# Patient Record
Sex: FEMALE | Race: WHITE | NOT HISPANIC OR LATINO | Employment: FULL TIME | URBAN - NONMETROPOLITAN AREA
[De-identification: names, ages, dates, MRNs, and addresses within clinical notes are randomized per-mention and may not be internally consistent; named-entity substitution may affect disease eponyms.]

---

## 2024-08-14 ENCOUNTER — LAB (OUTPATIENT)
Dept: LAB | Facility: HOSPITAL | Age: 29
End: 2024-08-14
Payer: COMMERCIAL

## 2024-08-14 ENCOUNTER — OFFICE VISIT (OUTPATIENT)
Dept: OBSTETRICS AND GYNECOLOGY | Age: 29
End: 2024-08-14
Payer: COMMERCIAL

## 2024-08-14 ENCOUNTER — LAB REQUISITION (OUTPATIENT)
Dept: LAB | Facility: HOSPITAL | Age: 29
End: 2024-08-14
Payer: COMMERCIAL

## 2024-08-14 VITALS
WEIGHT: 161.6 LBS | DIASTOLIC BLOOD PRESSURE: 76 MMHG | SYSTOLIC BLOOD PRESSURE: 118 MMHG | BODY MASS INDEX: 23.93 KG/M2 | HEIGHT: 69 IN

## 2024-08-14 DIAGNOSIS — R10.9 UNSPECIFIED ABDOMINAL PAIN: ICD-10-CM

## 2024-08-14 DIAGNOSIS — R10.2 PELVIC PAIN: ICD-10-CM

## 2024-08-14 DIAGNOSIS — Z34.90 EARLY STAGE OF PREGNANCY: Primary | ICD-10-CM

## 2024-08-14 DIAGNOSIS — R10.2 PELVIC AND PERINEAL PAIN: ICD-10-CM

## 2024-08-14 DIAGNOSIS — R10.9 LEFT FLANK PAIN: ICD-10-CM

## 2024-08-14 LAB
ABO GROUP BLD: NORMAL
BASOPHILS # BLD AUTO: 0.06 10*3/MM3 (ref 0–0.2)
BASOPHILS NFR BLD AUTO: 0.6 % (ref 0–1.5)
DEPRECATED RDW RBC AUTO: 40.5 FL (ref 37–54)
EOSINOPHIL # BLD AUTO: 0.14 10*3/MM3 (ref 0–0.4)
EOSINOPHIL NFR BLD AUTO: 1.5 % (ref 0.3–6.2)
ERYTHROCYTE [DISTWIDTH] IN BLOOD BY AUTOMATED COUNT: 12.8 % (ref 12.3–15.4)
HCG INTACT+B SERPL-ACNC: 360.5 MIU/ML
HCT VFR BLD AUTO: 38.2 % (ref 34–46.6)
HGB BLD-MCNC: 13 G/DL (ref 12–15.9)
IMM GRANULOCYTES # BLD AUTO: 0.03 10*3/MM3 (ref 0–0.05)
IMM GRANULOCYTES NFR BLD AUTO: 0.3 % (ref 0–0.5)
LYMPHOCYTES # BLD AUTO: 2.23 10*3/MM3 (ref 0.7–3.1)
LYMPHOCYTES NFR BLD AUTO: 23.4 % (ref 19.6–45.3)
MCH RBC QN AUTO: 29.7 PG (ref 26.6–33)
MCHC RBC AUTO-ENTMCNC: 34 G/DL (ref 31.5–35.7)
MCV RBC AUTO: 87.4 FL (ref 79–97)
MONOCYTES # BLD AUTO: 0.7 10*3/MM3 (ref 0.1–0.9)
MONOCYTES NFR BLD AUTO: 7.4 % (ref 5–12)
NEUTROPHILS NFR BLD AUTO: 6.36 10*3/MM3 (ref 1.7–7)
NEUTROPHILS NFR BLD AUTO: 66.8 % (ref 42.7–76)
NRBC BLD AUTO-RTO: 0 /100 WBC (ref 0–0.2)
PLATELET # BLD AUTO: 249 10*3/MM3 (ref 140–450)
PMV BLD AUTO: 10.1 FL (ref 6–12)
RBC # BLD AUTO: 4.37 10*6/MM3 (ref 3.77–5.28)
RH BLD: NEGATIVE
WBC NRBC COR # BLD AUTO: 9.52 10*3/MM3 (ref 3.4–10.8)

## 2024-08-14 PROCEDURE — 36415 COLL VENOUS BLD VENIPUNCTURE: CPT | Performed by: OBSTETRICS & GYNECOLOGY

## 2024-08-14 PROCEDURE — 85025 COMPLETE CBC W/AUTO DIFF WBC: CPT | Performed by: OBSTETRICS & GYNECOLOGY

## 2024-08-14 PROCEDURE — 84702 CHORIONIC GONADOTROPIN TEST: CPT | Performed by: OBSTETRICS & GYNECOLOGY

## 2024-08-14 PROCEDURE — 86900 BLOOD TYPING SEROLOGIC ABO: CPT | Performed by: OBSTETRICS & GYNECOLOGY

## 2024-08-14 PROCEDURE — 86901 BLOOD TYPING SEROLOGIC RH(D): CPT | Performed by: OBSTETRICS & GYNECOLOGY

## 2024-08-14 RX ORDER — PRENATAL VIT NO.126/IRON/FOLIC 28MG-0.8MG
TABLET ORAL DAILY
COMMUNITY

## 2024-08-14 NOTE — PROGRESS NOTES
"    Sanchez San MD  [facility]  [address]  [place] [address]  Office [phone]  Fax [phone]      [facility]  Jesus Chiang  : 1995  MRN: [id number]    Subjective   Subjective     Chief Complaint   Patient presents with    Follow-up     Pt here for pelvic pain during intercourse and positive pregnancy test. Pt states her pain is now persistent pain in back. This morning she took 5 pregnancy tests and all came back positive.       History of Present Illness  Jesus Chiang is a 29 y.o. female , No obstetric history on file., who comes to the office today for early pregnancy and pelvic pain/flank pain. Last week, had dyspareunia. Had a visit with PCP as pain lasted longer and was more painful than usual. Ultrasound at that time noted possible hemorrhagic cyst of left ovary. Received toradol and rx for hydrocodone 7.5 mg. Took it until this past Saturday as pain improved. Was doing well until today. Left flank pain. Ok at the moment but it is uncomfortable. Additionally, cycle is late and pregnancy test is positive.Patient's last menstrual period was 07/15/2024 (exact date). 4w2d by LMP.    Review of Systems   Genitourinary:  Negative for decreased urine volume, difficulty urinating, dyspareunia, dysuria, enuresis, flank pain, frequency, genital sores, hematuria, menstrual problem, pelvic pain, urgency, vaginal bleeding, vaginal discharge and vaginal pain.   Musculoskeletal:  Positive for back pain.   All other systems reviewed and are negative.       The following portions of the patient's history were reviewed and updated as appropriate: allergies, current medications, past family history, past medical history, past social history, past surgical history, and problem list.    Objective    Objective     Vitals:   Visit Vitals  /76   Ht 175.3 cm (69\")   Wt 73.3 kg (161 lb 9.6 oz)   LMP 07/15/2024 (Exact Date)   BMI 23.86 kg/m²        Physical Exam  Vitals reviewed.   Constitutional: "       General: She is not in acute distress.     Appearance: Normal appearance. She is not ill-appearing.   HENT:      Head: Normocephalic and atraumatic.      Nose: No congestion or rhinorrhea.   Eyes:      General: No scleral icterus.        Right eye: No discharge.         Left eye: No discharge.      Extraocular Movements: Extraocular movements intact.      Conjunctiva/sclera: Conjunctivae normal.   Pulmonary:      Effort: Pulmonary effort is normal. No accessory muscle usage or respiratory distress.   Abdominal:      General: Abdomen is flat.      Palpations: Abdomen is soft.      Tenderness: There is no abdominal tenderness. There is left CVA tenderness. There is no right CVA tenderness.   Musculoskeletal:      Right lower leg: No edema.      Left lower leg: No edema.   Skin:     General: Skin is warm and dry.      Coloration: Skin is not ashen, cyanotic or jaundiced.   Neurological:      General: No focal deficit present.      Mental Status: She is alert and oriented to person, place, and time.   Psychiatric:         Mood and Affect: Mood normal.         Behavior: Behavior is cooperative.       Result Review    US Ob < 14 Weeks Single or First Gestation (08/14/2024 13:34)      1.  Uterus: Normal size, with uterine dimensions 8 x 5.6 x 4.8 cm, and Anteverted     2.  Endometrium:  15 mm , no intrauterine pregnancy      3.  Myometrium: Normal homogenous texture      4.  Ovaries  Left:    Normal/unremarkable   Right:   Hemorrhagic vs corpus luteal cyst of the right ovary, measures 2.6 x 1.9 cm        Assessment & Plan   Assessment / Plan     Diagnoses and all orders for this visit:    1. Early stage of pregnancy (Primary)  -     HCG, Quantitative, Pregnancy (Hospital Lab Only)  -     ABO / Rh  -     CBC & Differential    2. Pelvic pain  -     HCG, Quantitative, Pregnancy (Hospital Lab Only)  -     ABO / Rh  -     CBC & Differential    3. Left flank pain        Discussion:   Tylenol encouraged for pain as needed.  Heat therapy to flank only as needed. Avoid toradol. With early pregnancy discussed checking hcg as well as CBC/ABO status. Plan to repeat hcg in 48 hours (Friday, has annual appointment then). She is concerned with ectopic pregnancy. Ultrasound reviewed with patient. Currently hemorrhagic/corpus luteal cyst on the right ovary at this time. Discussed ectopic with patient as well as symptoms typically with ectopic. If worsened pain, she will let us know. Questions answered. She expressed understanding.     Follow-up: Return for Next scheduled follow up.    Sanchez San MD

## 2024-08-15 ENCOUNTER — TELEPHONE (OUTPATIENT)
Dept: OBSTETRICS AND GYNECOLOGY | Age: 29
End: 2024-08-15

## 2024-08-15 DIAGNOSIS — Z34.90 EARLY STAGE OF PREGNANCY: Primary | ICD-10-CM

## 2024-08-15 NOTE — TELEPHONE ENCOUNTER
SHAGGY PARKER    [phone] (PLEASE LVM)    RETURNING A MISSED CALL    GOOD TIME TO CALL IS 12:45 WOULD BE BEST.

## 2024-08-16 ENCOUNTER — OFFICE VISIT (OUTPATIENT)
Dept: OBSTETRICS AND GYNECOLOGY | Age: 29
End: 2024-08-16
Payer: COMMERCIAL

## 2024-08-16 ENCOUNTER — LAB (OUTPATIENT)
Dept: LAB | Facility: HOSPITAL | Age: 29
End: 2024-08-16
Payer: COMMERCIAL

## 2024-08-16 DIAGNOSIS — Z53.21 PROC/TRTMT NOT CRD OUT D/T PT LV BEF SEEN BY HLTH CARE PROV: Primary | ICD-10-CM

## 2024-08-16 DIAGNOSIS — Z34.90 EARLY STAGE OF PREGNANCY: Primary | ICD-10-CM

## 2024-08-16 LAB — HCG INTACT+B SERPL-ACNC: 837.3 MIU/ML

## 2024-08-16 PROCEDURE — 86900 BLOOD TYPING SEROLOGIC ABO: CPT

## 2024-08-16 PROCEDURE — 86850 RBC ANTIBODY SCREEN: CPT

## 2024-08-16 PROCEDURE — 86901 BLOOD TYPING SEROLOGIC RH(D): CPT

## 2024-08-16 PROCEDURE — 36415 COLL VENOUS BLD VENIPUNCTURE: CPT | Performed by: OBSTETRICS & GYNECOLOGY

## 2024-08-16 PROCEDURE — 84702 CHORIONIC GONADOTROPIN TEST: CPT | Performed by: OBSTETRICS & GYNECOLOGY

## 2024-08-19 LAB
ABO GROUP BLD: NORMAL
BLD GP AB SCN SERPL QL: NEGATIVE
RH BLD: NEGATIVE
T&S EXPIRATION DATE: NORMAL

## 2024-08-22 ENCOUNTER — OFFICE VISIT (OUTPATIENT)
Age: 29
End: 2024-08-22
Payer: COMMERCIAL

## 2024-08-22 VITALS
SYSTOLIC BLOOD PRESSURE: 120 MMHG | WEIGHT: 158 LBS | BODY MASS INDEX: 23.4 KG/M2 | DIASTOLIC BLOOD PRESSURE: 70 MMHG | HEIGHT: 69 IN

## 2024-08-22 DIAGNOSIS — O20.0 THREATENED ABORTION: Primary | ICD-10-CM

## 2024-08-22 PROCEDURE — 99213 OFFICE O/P EST LOW 20 MIN: CPT

## 2024-08-22 NOTE — PROGRESS NOTES
"Subjective      Jesus Trevino is a 29 y.o. female who presents for bleeding in early pregnancy. LMP was 2024. Had a positive urine pregnancy test on . This morning she had some cramping and then a gush feeling. When she wiped, there was brown, mucus-like blood present. It continued for a while and then changed to spotting, which continues. She continues to have some cramping.     The following portions of the patient's history were reviewed and updated as appropriate: allergies, current medications, past medical history, past social history, and problem list.    heterosexual    Review of Systems  Review of Systems   Genitourinary:  Positive for amenorrhea, pelvic pain (cramping) and vaginal bleeding (brown, with mucus). Negative for pelvic pressure and vaginal pain.           Objective   US today:  GS: 0.82 cm avg 5w4d  GS w/YS seen today  Cyst Rt Ovary: 1.57 cm  Rt cyst appears hemorrhagic, probably corpus luteum    :      /70 (BP Location: Left arm, Patient Position: Sitting)   Ht 175.3 cm (69\")   Wt 71.7 kg (158 lb)   LMP 07/15/2024 (Exact Date)   BMI 23.33 kg/m²   Physical Exam  Vitals and nursing note reviewed.   Constitutional:       Appearance: Normal appearance. She is normal weight.   Pulmonary:      Effort: Pulmonary effort is normal.   Neurological:      Mental Status: She is alert.   Psychiatric:         Mood and Affect: Mood normal.         Behavior: Behavior normal.         Thought Content: Thought content normal.         Judgment: Judgment normal.             Assessment  & Plan  Diagnoses and all orders for this visit:    1. Threatened  (Primary)  Discussed bleeding in early pregnancy: that it can sometimes resolve spontaneously with no harm to the pregnancy; that it can sometimes be a sign of miscarriage.   Discussed coming back for follow up and ultrasound in 2 weeks. Patient already has scheduled new OB visit with Dr. Ron for 9/10/2024; she asked if she " could simply keep this appointment instead. Agreed that this would be fine. To call/come in with any further concerns prior to that date.  -     HCG, B-subunit, Quantitative ([facility] Only)         This note was electronically signed.    Camille Manzo CNM  8/22/2024  16:04 CDT

## 2024-08-23 LAB — HCG INTACT+B SERPL-ACNC: 6915 MIU/ML

## 2024-09-01 ENCOUNTER — NURSE TRIAGE (OUTPATIENT)
Dept: CALL CENTER | Facility: HOSPITAL | Age: 29
End: 2024-09-01
Payer: COMMERCIAL

## 2024-09-01 NOTE — TELEPHONE ENCOUNTER
"Reason for Disposition  • MILD vaginal bleeding (i.e., less than 1 pad / hour; less than patient's usual menstrual bleeding; not just spotting)    Additional Information  • Negative: Shock suspected (e.g., cold/pale/clammy skin, too weak to stand, low BP, rapid pulse)  • Negative: Difficult to awaken or acting confused (e.g., disoriented, slurred speech)  • Negative: Passed out (i.e., lost consciousness, collapsed and was not responding)  • Negative: Sounds like a life-threatening emergency to the triager  • Negative: [1] Vaginal bleeding AND [2] pregnant 20 or more weeks  • Negative: Not pregnant or pregnancy status unknown  • Negative: SEVERE abdominal pain  • Negative: SEVERE vaginal bleeding (e.g., soaking 2 pads per hour or large blood clots and present 2 or more hours)  • Negative: SEVERE dizziness (e.g., unable to stand, requires support to walk, feels like passing out)  • Negative: [1] MODERATE vaginal bleeding (e.g., soaking 1 pad per hour; clots) AND [2] present > 6 hours  • Negative: [1] MODERATE vaginal bleeding (e.g., soaking 1 pad per hour; clots) AND [2] pregnant > 12 weeks  • Negative: Passed tissue (e.g., gray-white)  • Negative: Shoulder pain  • Negative: Pale skin (pallor) of new-onset or worsening  • Negative: Patient sounds very sick or weak to the triager  • Negative: [1] Constant abdominal pain AND [2] present > 2 hours  • Negative: Fever 100.4 F (38.0 C) or higher  • Negative: [1] Intermittent lower abdominal pain (e.g., cramping) AND [2] present > 24 hours  • Negative: Prior history of \"ectopic pregnancy\" or previous tubal surgery (e.g., tubal ligation)  • Negative: Pain or burning with passing urine (urination)  • Negative: Using heparin (e.g., Lovenox) or other strong blood thinner, or known bleeding disorder (e.g., thrombocytopenia)  • Negative: MODERATE vaginal bleeding (e.g., soaking 1 pad per hour; clots)  • Negative: Has IUD    Answer Assessment - Initial Assessment Questions  1. " "ONSET: \"When did this bleeding start?\"        This am   2. DESCRIPTION: \"Describe the bleeding that you are having.\" \"How much bleeding is there?\"     - SPOTTING: spotting, or pinkish / brownish mucous discharge; does not fill panty liner or pad     - MILD:  less than 1 pad / hour; less than patient's usual menstrual bleeding    - MODERATE: 1-2 pads / hour; 1 menstrual cup every 6 hours; small-medium blood clots (e.g., pea, grape, small coin)    - SEVERE: soaking 2 or more pads/hour for 2 or more hours; 1 menstrual cup every 2 hours; bleeding not contained by pads or continuous red blood from vagina; large blood clots (e.g., golf ball, large coin)       mild  3. ABDOMINAL PAIN SEVERITY: If present, ask: \"How bad is it?\"  (e.g., Scale 1-10; mild, moderate, or severe)    - MILD (1-3): doesn't interfere with normal activities, abdomen soft and not tender to touch     - MODERATE (4-7): interferes with normal activities or awakens from sleep, abdomen tender to touch     - SEVERE (8-10): excruciating pain, doubled over, unable to do any normal activities      Back pain  4. PREGNANCY: \"Do you know how many weeks or months pregnant you are?\" \"When was the first day of your last normal menstrual period?\"      7 weeks  5. HEMODYNAMIC STATUS: \"Are you weak or feeling lightheaded?\" If Yes, ask: \"Can you stand and walk normally?\"       denies  6. OTHER SYMPTOMS: \"What other symptoms are you having with the bleeding?\" (e.g., passed tissue, vaginal discharge, fever, menstrual-type cramps)      Back pain    Protocols used: Pregnancy - Vaginal Bleeding Less Than 20 Weeks [id number]    "

## 2024-09-10 ENCOUNTER — INITIAL PRENATAL (OUTPATIENT)
Age: 29
End: 2024-09-10
Payer: COMMERCIAL

## 2024-09-10 VITALS — DIASTOLIC BLOOD PRESSURE: 82 MMHG | SYSTOLIC BLOOD PRESSURE: 122 MMHG | BODY MASS INDEX: 23.92 KG/M2 | WEIGHT: 162 LBS

## 2024-09-10 DIAGNOSIS — Z34.01 ENCOUNTER FOR SUPERVISION OF NORMAL FIRST PREGNANCY IN FIRST TRIMESTER: Primary | ICD-10-CM

## 2024-09-10 PROCEDURE — 0501F PRENATAL FLOW SHEET: CPT | Performed by: OBSTETRICS & GYNECOLOGY

## 2024-09-10 NOTE — PROGRESS NOTES
New OB, US ordered today, reviewed and shows 8 weeks gestation, EDC 4/20/25  Having nausea, fatigue, but no headaches  Had VB about 10 days ago, none since, DIANA noted on US  New OB labs ordered today  Discussed OTC Unisom and B6  Discussed RSV vaccine, Tdap, and flu vaccine recommendations  Discussed ffDNA screening option  Discussed normal prenatal routines  Questions answered    Diagnoses and all orders for this visit:    1. Encounter for supervision of normal first pregnancy in first trimester (Primary)  -     Chlamydia trachomatis, Neisseria gonorrhoeae, PCR w/ confirmation - Urine, Urine, Clean Catch  -     ABO / Rh  -     Ambulatory Referral to MFM/Perinatology  -     Antibody Screen  -     CBC & Differential  -     Hepatitis B Surface Antigen  -     Urine Culture - Urine, Urine, Clean Catch  -     ToxASSURE Select 13 (MW) - Urine, Clean Catch  -     Rubella Antibody, IgG  -     RPR Qualitative with Reflex to Quant  -     HIV-1 / O / 2 Ag / Antibody  -     Hepatitis C Antibody

## 2024-09-17 LAB
ABO GROUP BLD: NORMAL
BACTERIA UR CULT: NO GROWTH
BACTERIA UR CULT: NORMAL
BASOPHILS # BLD AUTO: 0.1 X10E3/UL (ref 0–0.2)
BASOPHILS NFR BLD AUTO: 1 %
BLD GP AB SCN SERPL QL: NEGATIVE
C TRACH RRNA SPEC QL NAA+PROBE: NEGATIVE
DRUGS UR: NORMAL
EOSINOPHIL # BLD AUTO: 0.1 X10E3/UL (ref 0–0.4)
EOSINOPHIL NFR BLD AUTO: 1 %
ERYTHROCYTE [DISTWIDTH] IN BLOOD BY AUTOMATED COUNT: 13 % (ref 11.7–15.4)
HBV SURFACE AG SERPL QL IA: NEGATIVE
HCT VFR BLD AUTO: 35.8 % (ref 34–46.6)
HCV IGG SERPL QL IA: NON REACTIVE
HGB BLD-MCNC: 11.5 G/DL (ref 11.1–15.9)
HIV 1+2 AB+HIV1 P24 AG SERPL QL IA: NON REACTIVE
IMM GRANULOCYTES # BLD AUTO: 0.1 X10E3/UL (ref 0–0.1)
IMM GRANULOCYTES NFR BLD AUTO: 1 %
LYMPHOCYTES # BLD AUTO: 2 X10E3/UL (ref 0.7–3.1)
LYMPHOCYTES NFR BLD AUTO: 21 %
MCH RBC QN AUTO: 29.3 PG (ref 26.6–33)
MCHC RBC AUTO-ENTMCNC: 32.1 G/DL (ref 31.5–35.7)
MCV RBC AUTO: 91 FL (ref 79–97)
MONOCYTES # BLD AUTO: 0.8 X10E3/UL (ref 0.1–0.9)
MONOCYTES NFR BLD AUTO: 8 %
N GONORRHOEA RRNA SPEC QL NAA+PROBE: NEGATIVE
NEUTROPHILS # BLD AUTO: 6.4 X10E3/UL (ref 1.4–7)
NEUTROPHILS NFR BLD AUTO: 68 %
PLATELET # BLD AUTO: 250 X10E3/UL (ref 150–450)
RBC # BLD AUTO: 3.92 X10E6/UL (ref 3.77–5.28)
RH BLD: NEGATIVE
RPR SER QL: NON REACTIVE
RUBV IGG SERPL IA-ACNC: 1.05 INDEX
WBC # BLD AUTO: 9.5 X10E3/UL (ref 3.4–10.8)

## 2024-10-08 ENCOUNTER — ROUTINE PRENATAL (OUTPATIENT)
Age: 29
End: 2024-10-08
Payer: COMMERCIAL

## 2024-10-08 VITALS — BODY MASS INDEX: 24.22 KG/M2 | SYSTOLIC BLOOD PRESSURE: 104 MMHG | DIASTOLIC BLOOD PRESSURE: 72 MMHG | WEIGHT: 164 LBS

## 2024-10-08 DIAGNOSIS — Z67.91 RH NEGATIVE STATUS DURING PREGNANCY IN SECOND TRIMESTER: Primary | ICD-10-CM

## 2024-10-08 DIAGNOSIS — O26.892 RH NEGATIVE STATUS DURING PREGNANCY IN SECOND TRIMESTER: Primary | ICD-10-CM

## 2024-10-08 RX ORDER — PYRIDOXINE HCL (VITAMIN B6) 100 MG
TABLET ORAL
COMMUNITY

## 2024-10-08 NOTE — PROGRESS NOTES
Feeling well, no nausea  Reviewed dating ultrasound  Reviewed normal prenatal labs  Discussed ffDNA and maternal carrier screening, declines for now  Offered flu vaccine    Diagnoses and all orders for this visit:    1. Rh negative status during pregnancy in second trimester (Primary)

## 2024-11-05 ENCOUNTER — ROUTINE PRENATAL (OUTPATIENT)
Age: 29
End: 2024-11-05
Payer: COMMERCIAL

## 2024-11-05 VITALS — DIASTOLIC BLOOD PRESSURE: 72 MMHG | SYSTOLIC BLOOD PRESSURE: 112 MMHG | WEIGHT: 165 LBS | BODY MASS INDEX: 24.37 KG/M2

## 2024-11-05 DIAGNOSIS — O26.892 RH NEGATIVE STATUS DURING PREGNANCY IN SECOND TRIMESTER: Primary | ICD-10-CM

## 2024-11-05 DIAGNOSIS — Z67.91 RH NEGATIVE STATUS DURING PREGNANCY IN SECOND TRIMESTER: Primary | ICD-10-CM

## 2024-11-05 LAB
BILIRUB BLD-MCNC: NEGATIVE MG/DL
GLUCOSE UR STRIP-MCNC: NEGATIVE MG/DL
KETONES UR QL: NEGATIVE
LEUKOCYTE EST, POC: ABNORMAL
NITRITE UR-MCNC: NEGATIVE MG/ML
PH UR: 7.5 [PH] (ref 5–8)
PROT UR STRIP-MCNC: ABNORMAL MG/DL
RBC # UR STRIP: ABNORMAL /UL
SP GR UR: 1.01 (ref 1–1.03)
UROBILINOGEN UR QL: NORMAL

## 2024-11-05 PROCEDURE — 0502F SUBSEQUENT PRENATAL CARE: CPT | Performed by: OBSTETRICS & GYNECOLOGY

## 2024-11-05 NOTE — PROGRESS NOTES
Feeling well  Gender peek today, GIRL  Schedule anatomy US 12/3    Diagnoses and all orders for this visit:    1. Rh negative status during pregnancy in second trimester (Primary)

## 2024-12-03 ENCOUNTER — ROUTINE PRENATAL (OUTPATIENT)
Age: 29
End: 2024-12-03
Payer: COMMERCIAL

## 2024-12-03 VITALS — WEIGHT: 175 LBS | DIASTOLIC BLOOD PRESSURE: 84 MMHG | BODY MASS INDEX: 25.84 KG/M2 | SYSTOLIC BLOOD PRESSURE: 122 MMHG

## 2024-12-03 DIAGNOSIS — Z67.91 RH NEGATIVE STATUS DURING PREGNANCY IN SECOND TRIMESTER: Primary | ICD-10-CM

## 2024-12-03 DIAGNOSIS — O26.892 RH NEGATIVE STATUS DURING PREGNANCY IN SECOND TRIMESTER: Primary | ICD-10-CM

## 2024-12-03 NOTE — PROGRESS NOTES
Starting to feel fetal movement  Feeling well  Has anatomy US later today  Discussed possible dizzy spells and ensuring adequate hydration    Diagnoses and all orders for this visit:    1. Rh negative status during pregnancy in second trimester (Primary)

## 2025-01-02 ENCOUNTER — ROUTINE PRENATAL (OUTPATIENT)
Age: 30
End: 2025-01-02
Payer: COMMERCIAL

## 2025-01-02 VITALS — BODY MASS INDEX: 26.88 KG/M2 | WEIGHT: 182 LBS | SYSTOLIC BLOOD PRESSURE: 124 MMHG | DIASTOLIC BLOOD PRESSURE: 82 MMHG

## 2025-01-02 DIAGNOSIS — Z67.91 RH NEGATIVE STATUS DURING PREGNANCY IN SECOND TRIMESTER: Primary | ICD-10-CM

## 2025-01-02 DIAGNOSIS — O26.892 RH NEGATIVE STATUS DURING PREGNANCY IN SECOND TRIMESTER: Primary | ICD-10-CM

## 2025-01-02 NOTE — PROGRESS NOTES
Good fetal movement  Reviewed normal anatomy US  Glucola and Hgb ordered for next visit    Diagnoses and all orders for this visit:    1. Rh negative status during pregnancy in second trimester (Primary)

## 2025-01-20 ENCOUNTER — TELEPHONE (OUTPATIENT)
Age: 30
End: 2025-01-20
Payer: COMMERCIAL

## 2025-01-20 ENCOUNTER — HOSPITAL ENCOUNTER (OUTPATIENT)
Facility: HOSPITAL | Age: 30
Discharge: HOME | End: 2025-01-20
Attending: OBSTETRICS & GYNECOLOGY | Admitting: OBSTETRICS & GYNECOLOGY
Payer: COMMERCIAL

## 2025-01-20 VITALS
TEMPERATURE: 98.2 F | WEIGHT: 173 LBS | DIASTOLIC BLOOD PRESSURE: 59 MMHG | BODY MASS INDEX: 25.62 KG/M2 | SYSTOLIC BLOOD PRESSURE: 114 MMHG | HEIGHT: 69 IN | HEART RATE: 96 BPM | RESPIRATION RATE: 16 BRPM

## 2025-01-20 PROBLEM — Z34.90 PREGNANCY: Status: ACTIVE | Noted: 2025-01-20

## 2025-01-20 PROCEDURE — G0463 HOSPITAL OUTPT CLINIC VISIT: HCPCS

## 2025-01-20 PROCEDURE — 59025 FETAL NON-STRESS TEST: CPT

## 2025-01-20 PROCEDURE — G0378 HOSPITAL OBSERVATION PER HR: HCPCS

## 2025-01-20 NOTE — PROGRESS NOTES
Jayy Trevino  : 1995  MRN: [id number]  CSN: [id number]    Labor & Delivery PIO progress note    Subjective   Chief Complaint: She reports decreased fetal movement earlier today, resolved prior to arrival with reassuring fetal surveillance    HPI:     History:    Prenatal Information:  Prenatal Results       Initial Prenatal Labs       Test Value Reference Range Date Time    Hemoglobin  11.5 g/dL 11.1 - 15.9 09/10/24 1331       13.0 g/dL 12.0 - 15.9 24 1431    Hematocrit  35.8 % 34.0 - 46.6 09/10/24 1331       38.2 % 34.0 - 46.6 24 1431    Platelets  250 x10E3/uL 150 - 450 09/10/24 1331       249 10*3/mm3 140 - 450 24 1431    Rubella IgG  1.05 index Immune >0.99 09/10/24 1331    Hepatitis B SAg  Negative  Negative 09/10/24 1331    Hepatitis C Ab  Non Reactive  Non Reactive 09/10/24 1331    RPR  Non Reactive  Non Reactive 09/10/24 1331    T. Pallidum Ab         ABO  A   09/10/24 1331    Rh  Negative   09/10/24 1331    Antibody Screen  Negative  Negative 09/10/24 1331       Negative   24 1239    HIV  Non Reactive  Non Reactive 09/10/24 1331    Urine Culture  Final report   09/10/24 1331    Gonorrhea  Negative  Negative 09/10/24 1331    Chlamydia  Negative  Negative 09/10/24 1331    TSH        HgB A1c         Varicella IgG        Hemoglobinopathy Fractionation        Hemoglobinopathy (genetic testing)        Cystic fibrosis         Spinal muscular atrophy        Fragile X                  Fetal testing        Test Value Reference Range Date Time    NIPT        MSAFP        AFP-4                  2nd and 3rd Trimester       Test Value Reference Range Date Time    Hemoglobin (repeated)        Hematocrit (repeated)        Platelets   250 x10E3/uL 150 - 450 09/10/24 1331       249 10*3/mm3 140 - 450 24 1431    1 hour GTT         Antibody Screen (repeated)        3rd TM syphilis scrn (repeated)  RPR         3rd TM syphilis scrn (repeated) TP-Ab        3rd TM syphilis  screen TB-Ab (FTA)        Syphilis cascade test TP-Ab (EIA)        Syphilis cascade TPPA        GTT Fasting        GTT 1 Hr        GTT 2 Hr        GTT 3 Hr        Group B Strep                  Other testing        Test Value Reference Range Date Time    Parvo IgG         CMV IgG                   Drug Screening       Test Value Reference Range Date Time    Amphetamine Screen        Barbiturate Screen        Benzodiazepine Screen        Methadone Screen        Phencyclidine Screen        Opiates Screen        THC Screen        Cocaine Screen        Propoxyphene Screen        Buprenorphine Screen        Methamphetamine Screen        Oxycodone Screen        Tricyclic Antidepressants Screen                  Legend    ^: Historical                          External Prenatal Results       Pregnancy Outside Results - Transcribed From Office Records - See Scanned Records For Details       Test Value Date Time    ABO  A  09/10/24 1331    Rh  Negative  09/10/24 1331    Antibody Screen  Negative  09/10/24 1331       Negative  08/16/24 1239    Varicella IgG       Rubella  1.05 index 09/10/24 1331    Hgb  11.5 g/dL 09/10/24 1331       13.0 g/dL 08/14/24 1431    Hct  35.8 % 09/10/24 1331       38.2 % 08/14/24 1431    HgB A1c        1h GTT       3h GTT Fasting       3h GTT 1 hour       3h GTT 2 hour       3h GTT 3 hour        Gonorrhea (discrete)  Negative  09/10/24 1331    Chlamydia (discrete)  Negative  09/10/24 1331    RPR  Non Reactive  09/10/24 1331    Syphils cascade: TP-Ab (FTA)       TP-Ab       TP-Ab (EIA)       TPPA       HBsAg  Negative  09/10/24 1331    Herpes Simplex Virus PCR       Herpes Simplex VIrus Culture       HIV  Non Reactive  09/10/24 1331    Hep C RNA Quant PCR       Hep C Antibody  Non Reactive  09/10/24 1331    AFP       NIPT       Cystic Fibrosis (David)       Cystic Fibroisis        Spinal Muscular atrophy       Fragile X       Group B Strep       GBS Susceptibility to Clindamycin       GBS  Susceptibility to Erythromycin       Fetal Fibronectin       Genetic Testing, Maternal Blood                 Drug Screening       Test Value Date Time    Urine Drug Screen       Amphetamine Screen       Barbiturate Screen       Benzodiazepine Screen       Methadone Screen       Phencyclidine Screen       Opiates Screen       THC Screen       Cocaine Screen       Propoxyphene Screen       Buprenorphine Screen       Methamphetamine Screen       Oxycodone Screen       Tricyclic Antidepressants Screen                 Legend    ^: Historical                             Past OB History:     OB History    Para Term  AB Living   1 0 0 0 0 0   SAB IAB Ectopic Molar Multiple Live Births   0 0 0 0 0 0      # Outcome Date GA Lbr Leonel/2nd Weight Sex Type Anes PTL Lv   1 Current                Past Medical History: Past Medical History:   Diagnosis Date    Anxiety and depression     Eating disorder     Anorexia, bulemia, Weight loss pills and laxatives.     GERD (gastroesophageal reflux disease)       Past Surgical History Past Surgical History:   Procedure Laterality Date    ENDOSCOPY      2011    WISDOM TOOTH EXTRACTION        Family History: Family History   Problem Relation Age of Onset    Depression Mother     Depression Father     No Known Problems Sister     Cancer Maternal Grandmother         anal cancer    Cancer Maternal Grandfather         Bone cancer? Unknown primary    Diabetes Maternal Grandfather     Cancer Paternal Grandmother         lung    Cancer Paternal Grandfather         skin cancer    Diabetes Paternal Grandfather       Social History:  reports that she has never smoked. She has never used smokeless tobacco.   reports current alcohol use.   reports no history of drug use.           High Risk Medical Conditions/Complaints this visit: none    Discussion of Management or Test Interpretation with physician/provider/healthcare provider: No    External Records Reviewed: Prenatal history in Epic from  [facility] OB provider - JACI Ron reviewed, pregnancy complicated by: uncomplicated    Review Previous Test Results: Prenatal labs in [facility] reviewed, history of: rh negative    Independent Historian: None    Social Determinants to Health: No drug, transportation or housing or other issues noted      Objective   Medical Decision Making:  Amount/Complexity of Data Reviewed  -Labs ordered - none  -Imaging ordered  -IV fluids given  Risk:  Prescription drug management  Drug therapy requiring intensive monitoring for toxicity  Decision to admit patient - no  Diagnosis/Treatment limited by social determinants    Min/max vitals past 24 hours:  Temp  Min: 98.2 °F (36.8 °C)  Max: 98.2 °F (36.8 °C)   BP  Min: 114/59  Max: 114/59   Pulse  Min: 96  Max: 96   Resp  Min: 16  Max: 16        Independent Interpretation NST/FHT's: reassuring and category 1.  external monitors used   Cervix: was not checked.   Contractions:    Review of current test: results none    N/a       Final Diagnoses: reassuring fetal surveillance       Assessment   IUP at 27w1d  Decreased fetal movement - resolved     Plan   Follow up with primary OB    Maribel Culp MD  1/20/2025  14:17 CST

## 2025-01-20 NOTE — TELEPHONE ENCOUNTER
Pt called with concern of decreased fetal movement.  Advised pt on fetal kick counts.  Educated pt on when to go to LDR.  Pt advised to go to LDR for decreased fetal movement, leaking of fluid, vaginal bleeding or regular/painful contractions.  Pt voiced understanding to all conversation.

## 2025-01-20 NOTE — NON STRESS TEST
Jesus Trevino, a  at 27w1d with an MARY of 2025, by Ultrasound, was seen at [facility] for a nonstress test.    Chief Complaint   Patient presents with    Decreased Fetal Movement     Last felt fetal movement at 1040       Patient Active Problem List   Diagnosis    Need for influenza vaccination    Anxiety and depression    Insomnia    History of eating disorder    Parasomnia    Pregnancy       Start Time: 1440  Stop Time: 1500    Interpretation A  Nonstress Test Interpretation A: Reactive  Comments A: verified by Monique MARIANO rn and Jessica SÁNCHEZ rn

## 2025-01-30 ENCOUNTER — ROUTINE PRENATAL (OUTPATIENT)
Age: 30
End: 2025-01-30
Payer: COMMERCIAL

## 2025-01-30 VITALS — SYSTOLIC BLOOD PRESSURE: 122 MMHG | DIASTOLIC BLOOD PRESSURE: 84 MMHG | BODY MASS INDEX: 27.32 KG/M2 | WEIGHT: 185 LBS

## 2025-01-30 DIAGNOSIS — O26.892 RH NEGATIVE STATUS DURING PREGNANCY IN SECOND TRIMESTER: Primary | ICD-10-CM

## 2025-01-30 DIAGNOSIS — Z3A.28 28 WEEKS GESTATION OF PREGNANCY: ICD-10-CM

## 2025-01-30 DIAGNOSIS — Z67.91 RH NEGATIVE STATUS DURING PREGNANCY IN SECOND TRIMESTER: Primary | ICD-10-CM

## 2025-01-30 NOTE — PROGRESS NOTES
Good fetal movement  Reviewed normal anatomy ultrasound  Glucola and Hgb today, Rh positive  Discuss Tdap next visit  Discussed [place] contractions    Diagnoses and all orders for this visit:    1. Rh negative status during pregnancy in second trimester (Primary)  -     Antibody Screen  -     Rhogam Immune Globulin Immunization    2. 28 weeks gestation of pregnancy  -     Gestational Screen 1 Hr ([facility])  -     Hemoglobin  -     RPR Qualitative with Reflex to Quant

## 2025-01-31 LAB
BLD GP AB SCN SERPL QL: NEGATIVE
GLUCOSE 1H P 50 G GLC PO SERPL-MCNC: 108 MG/DL (ref 70–139)
HGB BLD-MCNC: 11.1 G/DL (ref 11.1–15.9)
RPR SER QL: REACTIVE
RPR SER-TITR: ABNORMAL TITER

## 2025-02-05 DIAGNOSIS — A53.0 POSITIVE RPR TEST: Primary | ICD-10-CM

## 2025-02-06 LAB — TREPONEMA PALLIDUM IGG+IGM AB [PRESENCE] IN SERUM OR PLASMA BY IMMUNOASSAY: NON REACTIVE

## 2025-02-13 ENCOUNTER — ROUTINE PRENATAL (OUTPATIENT)
Age: 30
End: 2025-02-13
Payer: COMMERCIAL

## 2025-02-13 VITALS — WEIGHT: 187 LBS | BODY MASS INDEX: 27.62 KG/M2 | SYSTOLIC BLOOD PRESSURE: 132 MMHG | DIASTOLIC BLOOD PRESSURE: 82 MMHG

## 2025-02-13 DIAGNOSIS — O26.892 RH NEGATIVE STATUS DURING PREGNANCY IN SECOND TRIMESTER: Primary | ICD-10-CM

## 2025-02-13 DIAGNOSIS — Z67.91 RH NEGATIVE STATUS DURING PREGNANCY IN SECOND TRIMESTER: Primary | ICD-10-CM

## 2025-02-13 DIAGNOSIS — Z3A.30 30 WEEKS GESTATION OF PREGNANCY: ICD-10-CM

## 2025-02-13 NOTE — PROGRESS NOTES
Good fetal movement  No contractions, reflux controlled by Prilosec but having some leg cramps  Recommend magnesium supplement  Reviewed normal Glucola and Hgb  Tdap today   labor precautions    Diagnoses and all orders for this visit:    1. Rh negative status during pregnancy in second trimester (Primary)    2. 30 weeks gestation of pregnancy  -     Tdap Vaccine Greater Than or Equal To 6yo IM

## 2025-02-27 ENCOUNTER — ROUTINE PRENATAL (OUTPATIENT)
Age: 30
End: 2025-02-27
Payer: COMMERCIAL

## 2025-02-27 VITALS — DIASTOLIC BLOOD PRESSURE: 82 MMHG | WEIGHT: 190 LBS | SYSTOLIC BLOOD PRESSURE: 132 MMHG | BODY MASS INDEX: 28.06 KG/M2

## 2025-02-27 DIAGNOSIS — O26.892 RH NEGATIVE STATUS DURING PREGNANCY IN SECOND TRIMESTER: Primary | ICD-10-CM

## 2025-02-27 DIAGNOSIS — Z67.91 RH NEGATIVE STATUS DURING PREGNANCY IN SECOND TRIMESTER: Primary | ICD-10-CM

## 2025-02-27 DIAGNOSIS — Z3A.32 32 WEEKS GESTATION OF PREGNANCY: ICD-10-CM

## 2025-02-27 NOTE — PROGRESS NOTES
Good fetal movement  No contractions, still having leg cramps, will increase magnesium supplement  Reviewed normal Glucola and Hgb   labor precautions    Diagnoses and all orders for this visit:    1. Rh negative status during pregnancy in second trimester (Primary)    2. 32 weeks gestation of pregnancy

## 2025-03-13 ENCOUNTER — ROUTINE PRENATAL (OUTPATIENT)
Age: 30
End: 2025-03-13
Payer: COMMERCIAL

## 2025-03-13 VITALS — SYSTOLIC BLOOD PRESSURE: 134 MMHG | DIASTOLIC BLOOD PRESSURE: 80 MMHG | BODY MASS INDEX: 28.5 KG/M2 | WEIGHT: 193 LBS

## 2025-03-13 DIAGNOSIS — O26.892 RH NEGATIVE STATUS DURING PREGNANCY IN SECOND TRIMESTER: Primary | ICD-10-CM

## 2025-03-13 DIAGNOSIS — Z67.91 RH NEGATIVE STATUS DURING PREGNANCY IN SECOND TRIMESTER: Primary | ICD-10-CM

## 2025-03-13 DIAGNOSIS — Z3A.34 34 WEEKS GESTATION OF PREGNANCY: ICD-10-CM

## 2025-03-13 NOTE — PROGRESS NOTES
Good fetal movement  Labor precautions  Reviewed normal 1 hour glucose screening  GBS and cx's next visit    Diagnoses and all orders for this visit:    1. Rh negative status during pregnancy in second trimester (Primary)    2. 34 weeks gestation of pregnancy

## 2025-03-27 ENCOUNTER — ROUTINE PRENATAL (OUTPATIENT)
Age: 30
End: 2025-03-27
Payer: COMMERCIAL

## 2025-03-27 VITALS — DIASTOLIC BLOOD PRESSURE: 82 MMHG | WEIGHT: 199 LBS | BODY MASS INDEX: 29.39 KG/M2 | SYSTOLIC BLOOD PRESSURE: 152 MMHG

## 2025-03-27 DIAGNOSIS — Z67.91 RH NEGATIVE STATUS DURING PREGNANCY IN SECOND TRIMESTER: Primary | ICD-10-CM

## 2025-03-27 DIAGNOSIS — Z3A.36 36 WEEKS GESTATION OF PREGNANCY: ICD-10-CM

## 2025-03-27 DIAGNOSIS — O26.892 RH NEGATIVE STATUS DURING PREGNANCY IN SECOND TRIMESTER: Primary | ICD-10-CM

## 2025-03-27 PROCEDURE — 0502F SUBSEQUENT PRENATAL CARE: CPT | Performed by: OBSTETRICS & GYNECOLOGY

## 2025-03-27 NOTE — PROGRESS NOTES
Good fetal movement  Reviewed normal 1 hour glucose  Cervix closed, 50%, soft, posterior  GBS and GC/Chl ordered and done  Labor instructions    Diagnoses and all orders for this visit:    1. Rh negative status during pregnancy in second trimester (Primary)    2. 36 weeks gestation of pregnancy  -     Chlamydia trachomatis, Neisseria gonorrhoeae, PCR w/ confirmation - Swab, Cervix  -     Strep B Screen - Swab, Vaginal/Rectum

## 2025-03-28 ENCOUNTER — HOSPITAL ENCOUNTER (INPATIENT)
Facility: HOSPITAL | Age: 30
LOS: 3 days | Discharge: HOME | End: 2025-03-31
Attending: OBSTETRICS & GYNECOLOGY | Admitting: OBSTETRICS & GYNECOLOGY
Payer: COMMERCIAL

## 2025-03-28 ENCOUNTER — ANESTHESIA EVENT (OUTPATIENT)
Dept: LABOR AND DELIVERY | Facility: HOSPITAL | Age: 30
End: 2025-03-28
Payer: COMMERCIAL

## 2025-03-28 ENCOUNTER — ANESTHESIA (OUTPATIENT)
Dept: LABOR AND DELIVERY | Facility: HOSPITAL | Age: 30
End: 2025-03-28
Payer: COMMERCIAL

## 2025-03-28 PROBLEM — Z34.90 PREGNANCY: Status: RESOLVED | Noted: 2025-01-20 | Resolved: 2025-03-28

## 2025-03-28 LAB
ABO GROUP BLD: NORMAL
BASOPHILS # BLD AUTO: 0.05 10*3/MM3 (ref 0–0.2)
BASOPHILS NFR BLD AUTO: 0.4 % (ref 0–1.5)
BLD GP AB SCN SERPL QL: POSITIVE
DEPRECATED RDW RBC AUTO: 44.9 FL (ref 37–54)
EOSINOPHIL # BLD AUTO: 0.11 10*3/MM3 (ref 0–0.4)
EOSINOPHIL NFR BLD AUTO: 0.9 % (ref 0.3–6.2)
ERYTHROCYTE [DISTWIDTH] IN BLOOD BY AUTOMATED COUNT: 13.9 % (ref 12.3–15.4)
HCT VFR BLD AUTO: 34.4 % (ref 34–46.6)
HGB BLD-MCNC: 11.6 G/DL (ref 12–15.9)
IMM GRANULOCYTES # BLD AUTO: 0.1 10*3/MM3 (ref 0–0.05)
IMM GRANULOCYTES NFR BLD AUTO: 0.8 % (ref 0–0.5)
LYMPHOCYTES # BLD AUTO: 2.27 10*3/MM3 (ref 0.7–3.1)
LYMPHOCYTES NFR BLD AUTO: 18.3 % (ref 19.6–45.3)
MCH RBC QN AUTO: 30 PG (ref 26.6–33)
MCHC RBC AUTO-ENTMCNC: 33.7 G/DL (ref 31.5–35.7)
MCV RBC AUTO: 88.9 FL (ref 79–97)
MONOCYTES # BLD AUTO: 1.03 10*3/MM3 (ref 0.1–0.9)
MONOCYTES NFR BLD AUTO: 8.3 % (ref 5–12)
NEUTROPHILS NFR BLD AUTO: 71.3 % (ref 42.7–76)
NEUTROPHILS NFR BLD AUTO: 8.87 10*3/MM3 (ref 1.7–7)
NRBC BLD AUTO-RTO: 0 /100 WBC (ref 0–0.2)
PLATELET # BLD AUTO: 208 10*3/MM3 (ref 140–450)
PMV BLD AUTO: 10.6 FL (ref 6–12)
RBC # BLD AUTO: 3.87 10*6/MM3 (ref 3.77–5.28)
RESIDUAL RHIG DETECTED: NORMAL
RH BLD: NEGATIVE
T&S EXPIRATION DATE: NORMAL
TREPONEMA PALLIDUM IGG+IGM AB [PRESENCE] IN SERUM OR PLASMA BY IMMUNOASSAY: NORMAL
WBC NRBC COR # BLD AUTO: 12.43 10*3/MM3 (ref 3.4–10.8)

## 2025-03-28 PROCEDURE — 25010000002 LIDOCAINE PF 2% 2 % SOLUTION: Performed by: NURSE ANESTHETIST, CERTIFIED REGISTERED

## 2025-03-28 PROCEDURE — 25810000003 LACTATED RINGERS PER 1000 ML: Performed by: OBSTETRICS & GYNECOLOGY

## 2025-03-28 PROCEDURE — 51702 INSERT TEMP BLADDER CATH: CPT

## 2025-03-28 PROCEDURE — 25010000002 LIDOCAINE PF 1% 1 % SOLUTION: Performed by: NURSE ANESTHETIST, CERTIFIED REGISTERED

## 2025-03-28 PROCEDURE — 86901 BLOOD TYPING SEROLOGIC RH(D): CPT | Performed by: OBSTETRICS & GYNECOLOGY

## 2025-03-28 PROCEDURE — 86870 RBC ANTIBODY IDENTIFICATION: CPT | Performed by: OBSTETRICS & GYNECOLOGY

## 2025-03-28 PROCEDURE — 86850 RBC ANTIBODY SCREEN: CPT | Performed by: OBSTETRICS & GYNECOLOGY

## 2025-03-28 PROCEDURE — 0HQ9XZZ REPAIR PERINEUM SKIN, EXTERNAL APPROACH: ICD-10-PCS | Performed by: OBSTETRICS & GYNECOLOGY

## 2025-03-28 PROCEDURE — 85025 COMPLETE CBC W/AUTO DIFF WBC: CPT | Performed by: OBSTETRICS & GYNECOLOGY

## 2025-03-28 PROCEDURE — 25010000002 ROPIVACAINE PER 1 MG: Performed by: NURSE ANESTHETIST, CERTIFIED REGISTERED

## 2025-03-28 PROCEDURE — C1755 CATHETER, INTRASPINAL: HCPCS

## 2025-03-28 PROCEDURE — 86780 TREPONEMA PALLIDUM: CPT | Performed by: OBSTETRICS & GYNECOLOGY

## 2025-03-28 PROCEDURE — 59400 OBSTETRICAL CARE: CPT | Performed by: OBSTETRICS & GYNECOLOGY

## 2025-03-28 PROCEDURE — 86900 BLOOD TYPING SEROLOGIC ABO: CPT | Performed by: OBSTETRICS & GYNECOLOGY

## 2025-03-28 PROCEDURE — 25010000002 BUTORPHANOL PER 1 MG: Performed by: OBSTETRICS & GYNECOLOGY

## 2025-03-28 PROCEDURE — 25010000002 PENICILLIN G POTASSIUM PER 600000 UNITS: Performed by: OBSTETRICS & GYNECOLOGY

## 2025-03-28 PROCEDURE — 86850 RBC ANTIBODY SCREEN: CPT

## 2025-03-28 PROCEDURE — 99202 OFFICE O/P NEW SF 15 MIN: CPT | Performed by: OBSTETRICS & GYNECOLOGY

## 2025-03-28 RX ORDER — SODIUM CHLORIDE, SODIUM LACTATE, POTASSIUM CHLORIDE, CALCIUM CHLORIDE 600; 310; 30; 20 MG/100ML; MG/100ML; MG/100ML; MG/100ML
125 INJECTION, SOLUTION INTRAVENOUS CONTINUOUS
Status: DISCONTINUED | OUTPATIENT
Start: 2025-03-28 | End: 2025-03-29

## 2025-03-28 RX ORDER — OXYTOCIN/0.9 % SODIUM CHLORIDE 30/500 ML
250 PLASTIC BAG, INJECTION (ML) INTRAVENOUS CONTINUOUS
Status: ACTIVE | OUTPATIENT
Start: 2025-03-29 | End: 2025-03-29

## 2025-03-28 RX ORDER — CITRIC ACID/SODIUM CITRATE 334-500MG
30 SOLUTION, ORAL ORAL ONCE
Status: DISCONTINUED | OUTPATIENT
Start: 2025-03-28 | End: 2025-03-29 | Stop reason: HOSPADM

## 2025-03-28 RX ORDER — ROPIVACAINE HYDROCHLORIDE 2 MG/ML
INJECTION, SOLUTION EPIDURAL; INFILTRATION; PERINEURAL CONTINUOUS PRN
Status: DISCONTINUED | OUTPATIENT
Start: 2025-03-28 | End: 2025-03-28

## 2025-03-28 RX ORDER — ONDANSETRON 4 MG/1
4 TABLET, ORALLY DISINTEGRATING ORAL EVERY 6 HOURS PRN
Status: DISCONTINUED | OUTPATIENT
Start: 2025-03-28 | End: 2025-03-29 | Stop reason: HOSPADM

## 2025-03-28 RX ORDER — CITRIC ACID/SODIUM CITRATE 334-500MG
30 SOLUTION, ORAL ORAL ONCE AS NEEDED
Status: DISCONTINUED | OUTPATIENT
Start: 2025-03-28 | End: 2025-03-29 | Stop reason: HOSPADM

## 2025-03-28 RX ORDER — SODIUM CHLORIDE 9 MG/ML
40 INJECTION, SOLUTION INTRAVENOUS AS NEEDED
Status: DISCONTINUED | OUTPATIENT
Start: 2025-03-28 | End: 2025-03-29 | Stop reason: HOSPADM

## 2025-03-28 RX ORDER — CALCIUM CARBONATE 500 MG/1
2 TABLET, CHEWABLE ORAL 3 TIMES DAILY PRN
Status: DISCONTINUED | OUTPATIENT
Start: 2025-03-28 | End: 2025-03-29 | Stop reason: HOSPADM

## 2025-03-28 RX ORDER — BUTORPHANOL TARTRATE 2 MG/ML
2 INJECTION, SOLUTION INTRAMUSCULAR; INTRAVENOUS
Status: DISCONTINUED | OUTPATIENT
Start: 2025-03-28 | End: 2025-03-29 | Stop reason: HOSPADM

## 2025-03-28 RX ORDER — OXYTOCIN/0.9 % SODIUM CHLORIDE 30/500 ML
999 PLASTIC BAG, INJECTION (ML) INTRAVENOUS ONCE
Status: DISCONTINUED | OUTPATIENT
Start: 2025-03-28 | End: 2025-03-31 | Stop reason: HOSPADM

## 2025-03-28 RX ORDER — LIDOCAINE HYDROCHLORIDE 20 MG/ML
20 INJECTION, SOLUTION INFILTRATION; PERINEURAL AS NEEDED
Status: DISCONTINUED | OUTPATIENT
Start: 2025-03-28 | End: 2025-03-29

## 2025-03-28 RX ORDER — PENICILLIN G 3000000 [IU]/50ML
3 INJECTION, SOLUTION INTRAVENOUS EVERY 4 HOURS
Status: DISCONTINUED | OUTPATIENT
Start: 2025-03-28 | End: 2025-03-29 | Stop reason: HOSPADM

## 2025-03-28 RX ORDER — ACETAMINOPHEN 325 MG/1
650 TABLET ORAL EVERY 4 HOURS PRN
Status: DISCONTINUED | OUTPATIENT
Start: 2025-03-28 | End: 2025-03-29 | Stop reason: HOSPADM

## 2025-03-28 RX ORDER — CARBOPROST TROMETHAMINE 250 UG/ML
250 INJECTION, SOLUTION INTRAMUSCULAR AS NEEDED
Status: DISCONTINUED | OUTPATIENT
Start: 2025-03-28 | End: 2025-03-29 | Stop reason: HOSPADM

## 2025-03-28 RX ORDER — SODIUM CHLORIDE 0.9 % (FLUSH) 0.9 %
10 SYRINGE (ML) INJECTION AS NEEDED
Status: DISCONTINUED | OUTPATIENT
Start: 2025-03-28 | End: 2025-03-29 | Stop reason: HOSPADM

## 2025-03-28 RX ORDER — OXYTOCIN/0.9 % SODIUM CHLORIDE 30/500 ML
2-8 PLASTIC BAG, INJECTION (ML) INTRAVENOUS
Status: DISCONTINUED | OUTPATIENT
Start: 2025-03-28 | End: 2025-03-29

## 2025-03-28 RX ORDER — FAMOTIDINE 10 MG/ML
20 INJECTION, SOLUTION INTRAVENOUS ONCE AS NEEDED
Status: DISCONTINUED | OUTPATIENT
Start: 2025-03-28 | End: 2025-03-31 | Stop reason: HOSPADM

## 2025-03-28 RX ORDER — TERBUTALINE SULFATE 1 MG/ML
0.25 INJECTION SUBCUTANEOUS AS NEEDED
Status: DISCONTINUED | OUTPATIENT
Start: 2025-03-28 | End: 2025-03-29 | Stop reason: HOSPADM

## 2025-03-28 RX ORDER — LIDOCAINE HYDROCHLORIDE 20 MG/ML
INJECTION, SOLUTION EPIDURAL; INFILTRATION; INTRACAUDAL; PERINEURAL AS NEEDED
Status: DISCONTINUED | OUTPATIENT
Start: 2025-03-28 | End: 2025-03-28 | Stop reason: SURG

## 2025-03-28 RX ORDER — EPHEDRINE SULFATE 50 MG/ML
10 INJECTION, SOLUTION INTRAVENOUS
Status: DISCONTINUED | OUTPATIENT
Start: 2025-03-28 | End: 2025-03-29 | Stop reason: HOSPADM

## 2025-03-28 RX ORDER — LIDOCAINE HYDROCHLORIDE 20 MG/ML
INJECTION, SOLUTION EPIDURAL; INFILTRATION; INTRACAUDAL; PERINEURAL AS NEEDED
Status: DISCONTINUED | OUTPATIENT
Start: 2025-03-28 | End: 2025-03-28

## 2025-03-28 RX ORDER — MISOPROSTOL 200 UG/1
800 TABLET ORAL AS NEEDED
Status: DISCONTINUED | OUTPATIENT
Start: 2025-03-28 | End: 2025-03-29 | Stop reason: HOSPADM

## 2025-03-28 RX ORDER — LOPERAMIDE HYDROCHLORIDE 2 MG/1
2 CAPSULE ORAL 4 TIMES DAILY PRN
Status: DISCONTINUED | OUTPATIENT
Start: 2025-03-28 | End: 2025-03-29

## 2025-03-28 RX ORDER — ONDANSETRON 2 MG/ML
4 INJECTION INTRAMUSCULAR; INTRAVENOUS EVERY 6 HOURS PRN
Status: DISCONTINUED | OUTPATIENT
Start: 2025-03-28 | End: 2025-03-29 | Stop reason: HOSPADM

## 2025-03-28 RX ORDER — METHYLERGONOVINE MALEATE 0.2 MG/ML
200 INJECTION INTRAVENOUS ONCE AS NEEDED
Status: DISCONTINUED | OUTPATIENT
Start: 2025-03-28 | End: 2025-03-29 | Stop reason: HOSPADM

## 2025-03-28 RX ORDER — BUTORPHANOL TARTRATE 2 MG/ML
1 INJECTION, SOLUTION INTRAMUSCULAR; INTRAVENOUS
Status: DISCONTINUED | OUTPATIENT
Start: 2025-03-28 | End: 2025-03-29 | Stop reason: HOSPADM

## 2025-03-28 RX ORDER — LIDOCAINE HYDROCHLORIDE 10 MG/ML
INJECTION, SOLUTION EPIDURAL; INFILTRATION; INTRACAUDAL; PERINEURAL AS NEEDED
Status: DISCONTINUED | OUTPATIENT
Start: 2025-03-28 | End: 2025-03-28 | Stop reason: SURG

## 2025-03-28 RX ORDER — LIDOCAINE HYDROCHLORIDE AND EPINEPHRINE 15; 5 MG/ML; UG/ML
INJECTION, SOLUTION EPIDURAL
Status: COMPLETED | OUTPATIENT
Start: 2025-03-28 | End: 2025-03-28

## 2025-03-28 RX ORDER — IBUPROFEN 600 MG/1
600 TABLET, FILM COATED ORAL EVERY 6 HOURS PRN
Status: DISCONTINUED | OUTPATIENT
Start: 2025-03-28 | End: 2025-03-29 | Stop reason: HOSPADM

## 2025-03-28 RX ORDER — SODIUM CHLORIDE 0.9 % (FLUSH) 0.9 %
10 SYRINGE (ML) INJECTION EVERY 12 HOURS SCHEDULED
Status: DISCONTINUED | OUTPATIENT
Start: 2025-03-28 | End: 2025-03-29 | Stop reason: HOSPADM

## 2025-03-28 RX ADMIN — Medication 250 MILLI-UNITS/MIN: at 22:25

## 2025-03-28 RX ADMIN — SODIUM CHLORIDE, SODIUM LACTATE, POTASSIUM CHLORIDE, CALCIUM CHLORIDE 125 ML/HR: 20; 30; 600; 310 INJECTION, SOLUTION INTRAVENOUS at 09:25

## 2025-03-28 RX ADMIN — LOPERAMIDE HYDROCHLORIDE 2 MG: 2 CAPSULE ORAL at 12:24

## 2025-03-28 RX ADMIN — Medication 2 MILLI-UNITS/MIN: at 15:00

## 2025-03-28 RX ADMIN — LIDOCAINE HYDROCHLORIDE 40 MG: 10 INJECTION, SOLUTION EPIDURAL; INFILTRATION; INTRACAUDAL; PERINEURAL at 11:43

## 2025-03-28 RX ADMIN — LIDOCAINE HYDROCHLORIDE AND EPINEPHRINE 3 ML: 15; 5 INJECTION, SOLUTION EPIDURAL at 12:08

## 2025-03-28 RX ADMIN — ACETAMINOPHEN 650 MG: 325 TABLET, FILM COATED ORAL at 19:49

## 2025-03-28 RX ADMIN — SODIUM CHLORIDE 5 MILLION UNITS: 900 INJECTION INTRAVENOUS at 03:37

## 2025-03-28 RX ADMIN — BUTORPHANOL TARTRATE 1 MG: 2 INJECTION, SOLUTION INTRAMUSCULAR; INTRAVENOUS at 10:27

## 2025-03-28 RX ADMIN — PENICILLIN G 3 MILLION UNITS: 3000000 INJECTION, SOLUTION INTRAVENOUS at 12:23

## 2025-03-28 RX ADMIN — SODIUM CHLORIDE, SODIUM LACTATE, POTASSIUM CHLORIDE, CALCIUM CHLORIDE 125 ML/HR: 20; 30; 600; 310 INJECTION, SOLUTION INTRAVENOUS at 02:35

## 2025-03-28 RX ADMIN — PENICILLIN G 3 MILLION UNITS: 3000000 INJECTION, SOLUTION INTRAVENOUS at 19:30

## 2025-03-28 RX ADMIN — Medication 2 MILLI-UNITS/MIN: at 07:37

## 2025-03-28 RX ADMIN — ROPIVACAINE HYDROCHLORIDE 1.5 ML/HR: 2 INJECTION, SOLUTION EPIDURAL; INFILTRATION at 12:08

## 2025-03-28 RX ADMIN — PENICILLIN G 3 MILLION UNITS: 3000000 INJECTION, SOLUTION INTRAVENOUS at 07:32

## 2025-03-28 RX ADMIN — LIDOCAINE HYDROCHLORIDE 1 ML: 20 INJECTION, SOLUTION EPIDURAL; INFILTRATION; INTRACAUDAL; PERINEURAL at 12:22

## 2025-03-28 RX ADMIN — LIDOCAINE HYDROCHLORIDE 3 ML: 20 INJECTION, SOLUTION EPIDURAL; INFILTRATION; INTRACAUDAL; PERINEURAL at 12:24

## 2025-03-28 NOTE — PLAN OF CARE
Goal Outcome Evaluation:  Plan of Care Reviewed With: patient, spouse        Progress: improving  Outcome Evaluation: . 36&5. Came to LDR due to water breaking. Augmentation of labor with Pitocin. Epidural placed at 1205. Last SVE /1. Patient complains of no pain. VSS.

## 2025-03-28 NOTE — ANESTHESIA PREPROCEDURE EVALUATION
Anesthesia Evaluation     Patient summary reviewed and Nursing notes reviewed   no history of anesthetic complications:                Airway   Mallampati: II  TM distance: >3 FB  No difficulty expected  Dental - normal exam     Pulmonary - negative pulmonary ROS   Cardiovascular - negative cardio ROS  Exercise tolerance: good (4-7 METS)        Neuro/Psych- negative ROS  GI/Hepatic/Renal/Endo - negative ROS   (+) GERD    Musculoskeletal (-) negative ROS    Abdominal    Substance History - negative use     OB/GYN negative ob/gyn ROS   (+) Pregnant        Other - negative ROS                   Anesthesia Plan    ASA 2     epidural       Anesthetic plan, risks, benefits, and alternatives have been provided, discussed and informed consent has been obtained with: patient.    CODE STATUS:    Code Status (Patient has no pulse and is not breathing): CPR (Attempt to Resuscitate)  Medical Interventions (Patient has pulse or is breathing): Full Support

## 2025-03-28 NOTE — H&P (VIEW-ONLY)
Jayy Trevino  : 1995  MRN: [id number]  CSN: [id number]    Labor & Delivery PIO progress note    Subjective   Chief Complaint: She reports possible rupture of membranes. Fern and nitrazine positive. Unknown GBS status, was swabbed in office yesterday.    HPI:     History:    Prenatal Information:  Prenatal Results       Initial Prenatal Labs       Test Value Reference Range Date Time    Hemoglobin  11.5 g/dL 11.1 - 15.9 09/10/24 1331       13.0 g/dL 12.0 - 15.9 24 1431    Hematocrit  35.8 % 34.0 - 46.6 09/10/24 1331       38.2 % 34.0 - 46.6 24 1431    Platelets  250 x10E3/uL 150 - 450 09/10/24 1331       249 10*3/mm3 140 - 450 24 1431    Rubella IgG  1.05 index Immune >0.99 09/10/24 1331    Hepatitis B SAg  Negative  Negative 09/10/24 1331    Hepatitis C Ab  Non Reactive  Non Reactive 09/10/24 1331    RPR  Reactive  Non Reactive 25 0815       Non Reactive  Non Reactive 09/10/24 1331    T. Pallidum Ab   Non Reactive  Non Reactive 25 1318    ABO  A   25 0230    Rh  Negative   25 0230    Antibody Screen  Negative  Negative 09/10/24 1331       Negative   24 1239    HIV  Non Reactive  Non Reactive 09/10/24 1331    Urine Culture  Final report   09/10/24 1331    Gonorrhea  Negative  Negative 09/10/24 1331    Chlamydia  Negative  Negative 09/10/24 1331    TSH        HgB A1c         Varicella IgG        Hemoglobinopathy Fractionation        Hemoglobinopathy (genetic testing)        Cystic fibrosis         Spinal muscular atrophy        Fragile X                  Fetal testing        Test Value Reference Range Date Time    NIPT        MSAFP        AFP-4                  2nd and 3rd Trimester       Test Value Reference Range Date Time    Hemoglobin (repeated)  11.6 g/dL 12.0 - 15.9 25 0230       11.1 g/dL 11.1 - 15.9 25 0815    Hematocrit (repeated)  34.4 % 34.0 - 46.6 25 0230    Platelets   208 10*3/mm3 140 - 450 25 0230       250  x10E3/uL 150 - 450 09/10/24 1331       249 10*3/mm3 140 - 450 08/14/24 1431    1 hour GTT   108 mg/dL 70 - 139 01/30/25 0815    Antibody Screen (repeated)  Negative  Negative 01/30/25 0815    3rd TM syphilis scrn (repeated)  RPR   Reactive  Non Reactive 01/30/25 0815    3rd TM syphilis scrn (repeated) TP-Ab  Non Reactive  Non Reactive 02/05/25 1318    3rd TM syphilis screen TB-Ab (FTA)  Non Reactive  Non Reactive 02/05/25 1318    Syphilis cascade test TP-Ab (EIA)        Syphilis cascade TPPA        GTT Fasting        GTT 1 Hr        GTT 2 Hr        GTT 3 Hr        Group B Strep                  Other testing        Test Value Reference Range Date Time    Parvo IgG         CMV IgG                   Drug Screening       Test Value Reference Range Date Time    Amphetamine Screen        Barbiturate Screen        Benzodiazepine Screen        Methadone Screen        Phencyclidine Screen        Opiates Screen        THC Screen        Cocaine Screen        Propoxyphene Screen        Buprenorphine Screen        Methamphetamine Screen        Oxycodone Screen        Tricyclic Antidepressants Screen                  Legend    ^: Historical                          External Prenatal Results       Pregnancy Outside Results - Transcribed From Office Records - See Scanned Records For Details       Test Value Date Time    ABO  A  03/28/25 0230    Rh  Negative  03/28/25 0230    Antibody Screen  Negative  01/30/25 0815       Negative  09/10/24 1331       Negative  08/16/24 1239    Varicella IgG       Rubella  1.05 index 09/10/24 1331    Hgb  11.6 g/dL 03/28/25 0230       11.1 g/dL 01/30/25 0815       11.5 g/dL 09/10/24 1331       13.0 g/dL 08/14/24 1431    Hct  34.4 % 03/28/25 0230       35.8 % 09/10/24 1331       38.2 % 08/14/24 1431    HgB A1c        1h GTT  108 mg/dL 01/30/25 0815    3h GTT Fasting       3h GTT 1 hour       3h GTT 2 hour       3h GTT 3 hour        Gonorrhea (discrete)  Negative  09/10/24 1331    Chlamydia  (discrete)  Negative  09/10/24 1331    RPR  Reactive  25 0815       Non Reactive  09/10/24 1331    Syphils cascade: TP-Ab (FTA)  Non Reactive  25 1318    TP-Ab  Non Reactive  25 1318    TP-Ab (EIA)       TPPA       HBsAg  Negative  09/10/24 1331    Herpes Simplex Virus PCR       Herpes Simplex VIrus Culture       HIV  Non Reactive  09/10/24 1331    Hep C RNA Quant PCR       Hep C Antibody  Non Reactive  09/10/24 1331    AFP       NIPT       Cystic Fibrosis (David)       Cystic Fibroisis        Spinal Muscular atrophy       Fragile X       Group B Strep       GBS Susceptibility to Clindamycin       GBS Susceptibility to Erythromycin       Fetal Fibronectin       Genetic Testing, Maternal Blood                 Drug Screening       Test Value Date Time    Urine Drug Screen       Amphetamine Screen       Barbiturate Screen       Benzodiazepine Screen       Methadone Screen       Phencyclidine Screen       Opiates Screen       THC Screen       Cocaine Screen       Propoxyphene Screen       Buprenorphine Screen       Methamphetamine Screen       Oxycodone Screen       Tricyclic Antidepressants Screen                 Legend    ^: Historical                             Past OB History:     OB History    Para Term  AB Living   1 0 0 0 0 0   SAB IAB Ectopic Molar Multiple Live Births   0 0 0 0 0 0      # Outcome Date GA Lbr Leonel/2nd Weight Sex Type Anes PTL Lv   1 Current                Past Medical History: Past Medical History:   Diagnosis Date    Anxiety and depression     Eating disorder     Anorexia, bulemia, Weight loss pills and laxatives.     GERD (gastroesophageal reflux disease)       Past Surgical History Past Surgical History:   Procedure Laterality Date    ENDOSCOPY      2011    WISDOM TOOTH EXTRACTION        Family History: Family History   Problem Relation Age of Onset    Depression Mother     Depression Father     No Known Problems Sister     Cancer Maternal Grandmother          anal cancer    Cancer Maternal Grandfather         Bone cancer? Unknown primary    Diabetes Maternal Grandfather     Cancer Paternal Grandmother         lung    Cancer Paternal Grandfather         skin cancer    Diabetes Paternal Grandfather       Social History:  reports that she has never smoked. She has never used smokeless tobacco.   reports current alcohol use.   reports no history of drug use.           High Risk Medical Conditions/Complaints this visit: reactive RPR with neg TPA    Discussion of Management or Test Interpretation with physician/provider/healthcare provider: yes    External Records Reviewed: Prenatal history in [facility] from [facility] - JACI Ron reviewed, pregnancy complicated by: reactive RPR with neg TPA    Review Previous Test Results: Prenatal labs in [facility] reviewed, history of: anemia    Independent Historian: None    Social Determinants to Health: No drug, transportation or housing or other issues noted       Objective   Medical Decision Making:  Amount/Complexity of Data Reviewed  -Labs ordered - admission  -Imaging ordered  -IV fluids given - yes  Risk:  Prescription drug management  Drug therapy requiring intensive monitoring for toxicity  Decision to admit patient - yes  Diagnosis/Treatment limited by social determinants    Min/max vitals past 24 hours:  Temp  Min: 97.6 °F (36.4 °C)  Max: 97.6 °F (36.4 °C)   BP  Min: 126/88  Max: 152/82   Pulse  Min: 80  Max: 80   Resp  Min: 18  Max: 18        Independent Interpretation NST/FHT's: reassuring and category 1.  external monitors used   Cervix: was checked (by RN): 1 cm / 50 % / -3   Contractions:    Review of current test: results irregular    +fern, nitrazine       Final Diagnoses: Rupture of membranes before labor       Assessment   IUP at 36w5d  Rupture of membranes  Unknown GBS     Plan   Admit  GBS prophylaxis    Maribel Culp MD  3/28/2025  03:46 CDT

## 2025-03-28 NOTE — PROGRESS NOTES
Nitrous Oxide Assessment    This document serves as a preadmission assessment for pain management use of Nitrous Oxide in Labor & Delivery (L&D) at [facility].     The following medical issues are contraindicated in the use of nitrous oxide:    Physical inability to hold own face mask in place  Pernicious anemia or B12 deficiency  Daily Methadone or Suboxone use  Any concern for fetal status  Current diagnosis of sleep apnea  Recent Intraocular surgery  Increased intraocular pressure  Increased intracranial pressure  Pulmonary hypertension  Recent bowel obstruction  Recent middle ear surgery    I have assessed and determined that [unfilled]     [x] is a candidate for the use of Nitrous Oxide for pain management.     [] is NOT a candidate for the use of Nitrous Oxide for pain management.       Medical conditions may arise or change during pregnancy, labor, delivery and/or postpartum period that may void this assessment tool making the patient no longer a candidate for nitrous oxide use. Refer to the Nitrous Oxide for Analgesia in the Obstetrical Patient policy for additional contraindication that may deem nitrous oxide inappropriate for patient use.     Electronically signed by Mark Ron MD, 03/28/25, 8:37 AM CDT.

## 2025-03-28 NOTE — ANESTHESIA PROCEDURE NOTES
Labor Epidural      Patient location during procedure: OB  Performed By  CRNA/CAA: Tay Pierre CRNA  Preanesthetic Checklist  Completed: patient identified, IV checked, site marked, risks and benefits discussed, surgical consent, monitors and equipment checked, pre-op evaluation and timeout performed  Prep:  Pt Position:sitting  Sterile Tech:gloves, cap, sterile barrier and mask  Prep:povidone-iodine 7.5% surgical scrub  Monitoring:continuous pulse oximetry, EKG and blood pressure monitoring  Epidural Block Procedure:  Approach:midline  Guidance:palpation technique  Location:L4-L5  Needle Type:Tuohy  Needle Gauge:18 G  Loss of Resistance Medium: saline  Loss of Resistance: 7cm  Cath Depth at skin:14 cm  Paresthesia: none  Aspiration:negative  Test Dose:negative  Medication: lidocaine 1.5%-EPINEPHrine 1:200,000 (XYLOCAINE W/EPI) injection - Injection   3 mL - 3/28/2025 12:08:00 PM  Number of Attempts: 1  Post Assessment:  Dressing:secured with tape and occlusive dressing applied  Pt Tolerance:patient tolerated the procedure well with no apparent complications  Complications:no

## 2025-03-28 NOTE — PROGRESS NOTES
Jayy Trevino  : 1995  MRN: [id number]  CSN: [id number]    Labor & Delivery PIO progress note    Subjective   Chief Complaint: She reports possible rupture of membranes. Fern and nitrazine positive. Unknown GBS status, was swabbed in office yesterday.    HPI:     History:    Prenatal Information:  Prenatal Results       Initial Prenatal Labs       Test Value Reference Range Date Time    Hemoglobin  11.5 g/dL 11.1 - 15.9 09/10/24 1331       13.0 g/dL 12.0 - 15.9 24 1431    Hematocrit  35.8 % 34.0 - 46.6 09/10/24 1331       38.2 % 34.0 - 46.6 24 1431    Platelets  250 x10E3/uL 150 - 450 09/10/24 1331       249 10*3/mm3 140 - 450 24 1431    Rubella IgG  1.05 index Immune >0.99 09/10/24 1331    Hepatitis B SAg  Negative  Negative 09/10/24 1331    Hepatitis C Ab  Non Reactive  Non Reactive 09/10/24 1331    RPR  Reactive  Non Reactive 25 0815       Non Reactive  Non Reactive 09/10/24 1331    T. Pallidum Ab   Non Reactive  Non Reactive 25 1318    ABO  A   25 0230    Rh  Negative   25 0230    Antibody Screen  Negative  Negative 09/10/24 1331       Negative   24 1239    HIV  Non Reactive  Non Reactive 09/10/24 1331    Urine Culture  Final report   09/10/24 1331    Gonorrhea  Negative  Negative 09/10/24 1331    Chlamydia  Negative  Negative 09/10/24 1331    TSH        HgB A1c         Varicella IgG        Hemoglobinopathy Fractionation        Hemoglobinopathy (genetic testing)        Cystic fibrosis         Spinal muscular atrophy        Fragile X                  Fetal testing        Test Value Reference Range Date Time    NIPT        MSAFP        AFP-4                  2nd and 3rd Trimester       Test Value Reference Range Date Time    Hemoglobin (repeated)  11.6 g/dL 12.0 - 15.9 25 0230       11.1 g/dL 11.1 - 15.9 25 0815    Hematocrit (repeated)  34.4 % 34.0 - 46.6 25 0230    Platelets   208 10*3/mm3 140 - 450 25 0230       250  x10E3/uL 150 - 450 09/10/24 1331       249 10*3/mm3 140 - 450 08/14/24 1431    1 hour GTT   108 mg/dL 70 - 139 01/30/25 0815    Antibody Screen (repeated)  Negative  Negative 01/30/25 0815    3rd TM syphilis scrn (repeated)  RPR   Reactive  Non Reactive 01/30/25 0815    3rd TM syphilis scrn (repeated) TP-Ab  Non Reactive  Non Reactive 02/05/25 1318    3rd TM syphilis screen TB-Ab (FTA)  Non Reactive  Non Reactive 02/05/25 1318    Syphilis cascade test TP-Ab (EIA)        Syphilis cascade TPPA        GTT Fasting        GTT 1 Hr        GTT 2 Hr        GTT 3 Hr        Group B Strep                  Other testing        Test Value Reference Range Date Time    Parvo IgG         CMV IgG                   Drug Screening       Test Value Reference Range Date Time    Amphetamine Screen        Barbiturate Screen        Benzodiazepine Screen        Methadone Screen        Phencyclidine Screen        Opiates Screen        THC Screen        Cocaine Screen        Propoxyphene Screen        Buprenorphine Screen        Methamphetamine Screen        Oxycodone Screen        Tricyclic Antidepressants Screen                  Legend    ^: Historical                          External Prenatal Results       Pregnancy Outside Results - Transcribed From Office Records - See Scanned Records For Details       Test Value Date Time    ABO  A  03/28/25 0230    Rh  Negative  03/28/25 0230    Antibody Screen  Negative  01/30/25 0815       Negative  09/10/24 1331       Negative  08/16/24 1239    Varicella IgG       Rubella  1.05 index 09/10/24 1331    Hgb  11.6 g/dL 03/28/25 0230       11.1 g/dL 01/30/25 0815       11.5 g/dL 09/10/24 1331       13.0 g/dL 08/14/24 1431    Hct  34.4 % 03/28/25 0230       35.8 % 09/10/24 1331       38.2 % 08/14/24 1431    HgB A1c        1h GTT  108 mg/dL 01/30/25 0815    3h GTT Fasting       3h GTT 1 hour       3h GTT 2 hour       3h GTT 3 hour        Gonorrhea (discrete)  Negative  09/10/24 1331    Chlamydia  (discrete)  Negative  09/10/24 1331    RPR  Reactive  25 0815       Non Reactive  09/10/24 1331    Syphils cascade: TP-Ab (FTA)  Non Reactive  25 1318    TP-Ab  Non Reactive  25 1318    TP-Ab (EIA)       TPPA       HBsAg  Negative  09/10/24 1331    Herpes Simplex Virus PCR       Herpes Simplex VIrus Culture       HIV  Non Reactive  09/10/24 1331    Hep C RNA Quant PCR       Hep C Antibody  Non Reactive  09/10/24 1331    AFP       NIPT       Cystic Fibrosis (David)       Cystic Fibroisis        Spinal Muscular atrophy       Fragile X       Group B Strep       GBS Susceptibility to Clindamycin       GBS Susceptibility to Erythromycin       Fetal Fibronectin       Genetic Testing, Maternal Blood                 Drug Screening       Test Value Date Time    Urine Drug Screen       Amphetamine Screen       Barbiturate Screen       Benzodiazepine Screen       Methadone Screen       Phencyclidine Screen       Opiates Screen       THC Screen       Cocaine Screen       Propoxyphene Screen       Buprenorphine Screen       Methamphetamine Screen       Oxycodone Screen       Tricyclic Antidepressants Screen                 Legend    ^: Historical                             Past OB History:     OB History    Para Term  AB Living   1 0 0 0 0 0   SAB IAB Ectopic Molar Multiple Live Births   0 0 0 0 0 0      # Outcome Date GA Lbr Leonel/2nd Weight Sex Type Anes PTL Lv   1 Current                Past Medical History: Past Medical History:   Diagnosis Date    Anxiety and depression     Eating disorder     Anorexia, bulemia, Weight loss pills and laxatives.     GERD (gastroesophageal reflux disease)       Past Surgical History Past Surgical History:   Procedure Laterality Date    ENDOSCOPY      2011    WISDOM TOOTH EXTRACTION        Family History: Family History   Problem Relation Age of Onset    Depression Mother     Depression Father     No Known Problems Sister     Cancer Maternal Grandmother          anal cancer    Cancer Maternal Grandfather         Bone cancer? Unknown primary    Diabetes Maternal Grandfather     Cancer Paternal Grandmother         lung    Cancer Paternal Grandfather         skin cancer    Diabetes Paternal Grandfather       Social History:  reports that she has never smoked. She has never used smokeless tobacco.   reports current alcohol use.   reports no history of drug use.           High Risk Medical Conditions/Complaints this visit: reactive RPR with neg TPA    Discussion of Management or Test Interpretation with physician/provider/healthcare provider: yes    External Records Reviewed: Prenatal history in [facility] from [facility] provider - JACI Ron reviewed, pregnancy complicated by: reactive RPR with neg TPA    Review Previous Test Results: Prenatal labs in [facility] reviewed, history of: anemia    Independent Historian: None    Social Determinants to Health: No drug, transportation or housing or other issues noted       Objective   Medical Decision Making:  Amount/Complexity of Data Reviewed  -Labs ordered - admission  -Imaging ordered  -IV fluids given - yes  Risk:  Prescription drug management  Drug therapy requiring intensive monitoring for toxicity  Decision to admit patient - yes  Diagnosis/Treatment limited by social determinants    Min/max vitals past 24 hours:  Temp  Min: 97.6 °F (36.4 °C)  Max: 97.6 °F (36.4 °C)   BP  Min: 126/88  Max: 152/82   Pulse  Min: 80  Max: 80   Resp  Min: 18  Max: 18        Independent Interpretation NST/FHT's: reassuring and category 1.  external monitors used   Cervix: was checked (by RN): 1 cm / 50 % / -3   Contractions:    Review of current test: results irregular    +fern, nitrazine       Final Diagnoses: Rupture of membranes before labor       Assessment   IUP at 36w5d  Rupture of membranes  Unknown GBS     Plan   Admit  GBS prophylaxis    Maribel Culp MD  3/28/2025  03:46 CDT

## 2025-03-28 NOTE — PROGRESS NOTES
Mark Ron MD  [facility]  [address]  [place] [id number]  Office [phone]  Fax [phone]       Jayy Trevino  : 1995  MRN: [id number]  CSN: [id number]    Labor progress note    Subjective   She reports is comfortable with the epidural     Objective   Min/max vitals past 24 hours:  Temp  Min: 97.6 °F (36.4 °C)  Max: 98.6 °F (37 °C)   BP  Min: 99/45  Max: 145/70   Pulse  Min: 66  Max: 107   Resp  Min: 16  Max: 18        FHT's: reassuring and category 2.  external monitors used   Cervix: was checked (by me): 6 cm / 80 % / -2  Position feels OP, moderate caput   Contractions: irregular      Assessment   IUP at 36w5d  Active labor     Plan   1.   Continue with augmentation  Maternal positioning to try to rotate position  Allow labor to continue pending maternal and fetal status  Plan discussed with family and questions answered.  Understanding verbalized.    Mark Ron MD  3/28/2025  16:03 CDT

## 2025-03-29 LAB
ABO GROUP BLD: NORMAL
BLD GP AB SCN SERPL QL: POSITIVE
FETAL BLEED: NEGATIVE
HCT VFR BLD AUTO: 29.5 % (ref 34–46.6)
HGB BLD-MCNC: 9.9 G/DL (ref 12–15.9)
NUMBER OF DOSES: NORMAL
RH BLD: NEGATIVE

## 2025-03-29 PROCEDURE — 85018 HEMOGLOBIN: CPT | Performed by: OBSTETRICS & GYNECOLOGY

## 2025-03-29 PROCEDURE — 85461 HEMOGLOBIN FETAL: CPT | Performed by: OBSTETRICS & GYNECOLOGY

## 2025-03-29 PROCEDURE — 85014 HEMATOCRIT: CPT | Performed by: OBSTETRICS & GYNECOLOGY

## 2025-03-29 PROCEDURE — 86901 BLOOD TYPING SEROLOGIC RH(D): CPT | Performed by: OBSTETRICS & GYNECOLOGY

## 2025-03-29 PROCEDURE — 86900 BLOOD TYPING SEROLOGIC ABO: CPT | Performed by: OBSTETRICS & GYNECOLOGY

## 2025-03-29 PROCEDURE — 25010000002 RHO D IMMUNE GLOBULIN 1500 UNIT/2ML SOLUTION PREFILLED SYRINGE: Performed by: OBSTETRICS & GYNECOLOGY

## 2025-03-29 RX ORDER — NALOXONE HCL 0.4 MG/ML
0.4 VIAL (ML) INJECTION
Status: DISCONTINUED | OUTPATIENT
Start: 2025-03-29 | End: 2025-03-30 | Stop reason: ALTCHOICE

## 2025-03-29 RX ORDER — MISOPROSTOL 200 UG/1
600 TABLET ORAL ONCE AS NEEDED
Status: DISCONTINUED | OUTPATIENT
Start: 2025-03-29 | End: 2025-03-31 | Stop reason: HOSPADM

## 2025-03-29 RX ORDER — IBUPROFEN 600 MG/1
600 TABLET, FILM COATED ORAL EVERY 6 HOURS PRN
Status: DISCONTINUED | OUTPATIENT
Start: 2025-03-29 | End: 2025-03-31 | Stop reason: HOSPADM

## 2025-03-29 RX ORDER — MORPHINE SULFATE 2 MG/ML
1 INJECTION, SOLUTION INTRAMUSCULAR; INTRAVENOUS EVERY 4 HOURS PRN
Status: DISCONTINUED | OUTPATIENT
Start: 2025-03-29 | End: 2025-03-30 | Stop reason: ALTCHOICE

## 2025-03-29 RX ORDER — SODIUM CHLORIDE 0.9 % (FLUSH) 0.9 %
1-10 SYRINGE (ML) INJECTION AS NEEDED
Status: DISCONTINUED | OUTPATIENT
Start: 2025-03-29 | End: 2025-03-31 | Stop reason: HOSPADM

## 2025-03-29 RX ORDER — METHYLERGONOVINE MALEATE 0.2 MG/ML
200 INJECTION INTRAVENOUS ONCE AS NEEDED
Status: DISCONTINUED | OUTPATIENT
Start: 2025-03-29 | End: 2025-03-31 | Stop reason: HOSPADM

## 2025-03-29 RX ORDER — OXYTOCIN/0.9 % SODIUM CHLORIDE 30/500 ML
125 PLASTIC BAG, INJECTION (ML) INTRAVENOUS ONCE AS NEEDED
Status: DISCONTINUED | OUTPATIENT
Start: 2025-03-29 | End: 2025-03-31 | Stop reason: HOSPADM

## 2025-03-29 RX ORDER — PROMETHAZINE HYDROCHLORIDE 25 MG/1
25 TABLET ORAL EVERY 6 HOURS PRN
Status: DISCONTINUED | OUTPATIENT
Start: 2025-03-29 | End: 2025-03-31 | Stop reason: HOSPADM

## 2025-03-29 RX ORDER — ONDANSETRON 4 MG/1
4 TABLET, ORALLY DISINTEGRATING ORAL EVERY 8 HOURS PRN
Status: DISCONTINUED | OUTPATIENT
Start: 2025-03-29 | End: 2025-03-31 | Stop reason: HOSPADM

## 2025-03-29 RX ORDER — ONDANSETRON 2 MG/ML
4 INJECTION INTRAMUSCULAR; INTRAVENOUS EVERY 6 HOURS PRN
Status: DISCONTINUED | OUTPATIENT
Start: 2025-03-29 | End: 2025-03-31 | Stop reason: HOSPADM

## 2025-03-29 RX ORDER — TRAMADOL HYDROCHLORIDE 50 MG/1
50 TABLET ORAL EVERY 6 HOURS PRN
Status: DISCONTINUED | OUTPATIENT
Start: 2025-03-29 | End: 2025-03-31 | Stop reason: HOSPADM

## 2025-03-29 RX ORDER — CALCIUM CARBONATE 500 MG/1
2 TABLET, CHEWABLE ORAL 3 TIMES DAILY PRN
Status: DISCONTINUED | OUTPATIENT
Start: 2025-03-29 | End: 2025-03-31 | Stop reason: HOSPADM

## 2025-03-29 RX ORDER — HYDROCORTISONE 25 MG/G
1 CREAM TOPICAL AS NEEDED
Status: DISCONTINUED | OUTPATIENT
Start: 2025-03-29 | End: 2025-03-31 | Stop reason: HOSPADM

## 2025-03-29 RX ORDER — HYDROXYZINE HYDROCHLORIDE 25 MG/1
50 TABLET, FILM COATED ORAL NIGHTLY PRN
Status: DISCONTINUED | OUTPATIENT
Start: 2025-03-29 | End: 2025-03-30

## 2025-03-29 RX ORDER — DOCUSATE SODIUM 100 MG/1
100 CAPSULE, LIQUID FILLED ORAL 2 TIMES DAILY
Status: DISCONTINUED | OUTPATIENT
Start: 2025-03-29 | End: 2025-03-31 | Stop reason: HOSPADM

## 2025-03-29 RX ORDER — ACETAMINOPHEN 325 MG/1
650 TABLET ORAL EVERY 6 HOURS PRN
Status: DISCONTINUED | OUTPATIENT
Start: 2025-03-29 | End: 2025-03-31 | Stop reason: HOSPADM

## 2025-03-29 RX ORDER — PRENATAL VIT/IRON FUM/FOLIC AC 27MG-0.8MG
1 TABLET ORAL DAILY
Status: DISCONTINUED | OUTPATIENT
Start: 2025-03-29 | End: 2025-03-31 | Stop reason: HOSPADM

## 2025-03-29 RX ORDER — BISACODYL 10 MG
10 SUPPOSITORY, RECTAL RECTAL DAILY PRN
Status: DISCONTINUED | OUTPATIENT
Start: 2025-03-29 | End: 2025-03-31 | Stop reason: HOSPADM

## 2025-03-29 RX ORDER — PROMETHAZINE HYDROCHLORIDE 12.5 MG/1
12.5 SUPPOSITORY RECTAL EVERY 6 HOURS PRN
Status: DISCONTINUED | OUTPATIENT
Start: 2025-03-29 | End: 2025-03-31 | Stop reason: HOSPADM

## 2025-03-29 RX ADMIN — HUMAN RHO(D) IMMUNE GLOBULIN 1500 UNITS: 1500 SOLUTION INTRAMUSCULAR; INTRAVENOUS at 20:23

## 2025-03-29 RX ADMIN — IBUPROFEN 600 MG: 600 TABLET, FILM COATED ORAL at 00:07

## 2025-03-29 RX ADMIN — IBUPROFEN 600 MG: 600 TABLET ORAL at 15:22

## 2025-03-29 RX ADMIN — IBUPROFEN 600 MG: 600 TABLET ORAL at 06:06

## 2025-03-29 RX ADMIN — DOCUSATE SODIUM 100 MG: 100 CAPSULE, LIQUID FILLED ORAL at 07:56

## 2025-03-29 RX ADMIN — PRENATAL VIT W/ FE FUMARATE-FA TAB 27-0.8 MG 1 TABLET: 27-0.8 TAB at 07:55

## 2025-03-29 RX ADMIN — Medication: at 01:42

## 2025-03-29 RX ADMIN — ACETAMINOPHEN 650 MG: 325 TABLET, FILM COATED ORAL at 00:07

## 2025-03-29 RX ADMIN — DOCUSATE SODIUM 100 MG: 100 CAPSULE, LIQUID FILLED ORAL at 20:22

## 2025-03-29 NOTE — NURSING NOTE
RN entered patient's room at 1920.  Remained at bedside while patient was repositioned several times, including hands and knees, during pushing until after delivery of infant until 2245.

## 2025-03-29 NOTE — LACTATION NOTE
"Mother's Name: Jesus Phone #:[phone]  Infant Name: Osmin  :2026 @2154  Gestation: 36w5d  Day of life:11 hrs  Birth weight:  6-10.2 (3010g) Katharine+ Discharge weight:  Weight Loss:   24 hour Summary of Feeds: 3BF Voids: DTV Stools:2  Assistive devices (shields, shells, etc): NA  Significant Maternal history:, anxiety, depression, anorexia, bulemia, GERD  Maternal Concerns:  denies  Maternal Goal: exclusive breastfeeding  Mother's Medications: choline, Magnesium, prilosec, prasterone, PNV  Breastpump for home: RX faxed to [facility]  Ped follow up appt:    Called to room for assistance. Upon entering room. OB MD at bedside. After MD consultation complete. Offered assistance to feed and educate. Patient reports infants last blood sugar ok, but the 0500 was borderline, reports feedings as sleepy and latch inconsistent. This feeding began at 0800. With permission, infant undressed and waking performed with \"baby sit ups\". Initially infant eager, fussy but falls asleep immediately upon return to mother.multiple drops EBM hand expressed and finger swept into infant's mouth.  With combined 30 mins attmept to latch, advised to place infant STS with mother, continue hand expressing and providing EBM, attempt waking and latching again in 1 hour. Discussed importance of quality feedings and offering of EBM in addition to anytime at breast, due to infants  gestation, infant at higher risk for poor feedings and hypoglycemia.discussed options for supplementing IF needed for glucose management, DBM or formula, mother prefers DBM. Education then interrupted by LDR staff, entering room to visit with patient. LDR staff took infant from mother, swaddled up and holding infant while talking to mother. This RN attempted to complete education but was unable due to disruption of consult. This RN stepped out of room to collect pumping supplies and DBM consent. Upon entering room, 2nd LDR nurse holding infant. Pump " set up to bedside, attempted to provide education of pump use when 2 additional ldr staff presented to room. Education unable to be completed at this time. Obtained consent for DBM. Updated RN and consent placed on infant's chart.     Instructed patient our lactation team is here for continued support throughout their breastfeeding journey. Our team has encouraged patient to call with any questions or concerns that may arise after discharge.     Breastfeeding and Diaper Chart  Check List for Essentials of Positioning And Latch-on handout provided by Lactation Education Resources  Hand Expression handout provided by Lactation Education Resources  Five Keys to Successful Breastfeeding handout provided by Lactation Education [employer]    The Many Benefits if Breastfeeding handout given  Breastfeeding saves time  *Breastfeeding allows you to calm or feed your baby immediately, which leads to a happier baby who cries less  *There is nothing to buy, prepare, or maintain.There is nothing to clean or sterilize.  Breastfeeding builds a mothers confidence  *She knows all her baby needs to thrive is her!  Breastfeeding saves Money  *There is no formula to buy and healthier breast fed babies have less medical costs  Healthy Mom/Healthy baby  * babies get sick less often, and when they do they are usually sick less severely and for a shorter time  * babies have fewer ear infections  * babies have fewer allergies  *Mothers who breastfeed have a lower risk for cancer, osteoporosis, anemia, high blood pressure, obesity, and Type ll diabetes  *Mothers miss less work days with sick babies  Breast fed babies have a better dental health  * babies have better jaw development which requires lest orthodontic work  *Breast milk does not promote cavities  * babies can nurse at night without worry of tooth decay  Breastfeeding allows a baby to reach his full IQ potential  *The longer a baby is  breast fed, the better their brain development  Breast fed babies and moms are more relaxed  *The hormones released during breastfeeding have a calming effect on mothers  *Breastfeeding requires mom to take a break; this may help mom get more rest after delivery  *Breastfeeding is quicker than preparing formula which allows mom and baby to get back to sleep faster  *Breastfeeding promotes bonding and allows mom to learn babies cues and care needs more quickly  Breastfeeding cleanup is easier  *The bowel movements and spit up of breast fed babies doesn't smell as bad  *Spit-up of breast fed babies doesn't stain clothing  Getting out of the hourse is easier  *No formula bottles to prepare and carry safely   *No time restraints due to worry about what baby will eat  *No worries about warming a bottle or finding safe water to prepare bottles  Breastfeeding mother get their bodies back sooner  *The uterus shrinks more quickly and completely, which allows a flatter tummy  *Breastfeeding burns 400-500 calories a day; making milk torches stored fat!  Breastfeeding is better for the environment  *There is no trash to dispose of after breastfeeding  *There is no production facility to produce breast milk; moms body does it all without the pollution of a factory      Your Guide to Breastfeeding Booklet by [employer], www.[employer]      Safe Storage of Breastmilk magnet: [employer]

## 2025-03-29 NOTE — ANESTHESIA POSTPROCEDURE EVALUATION
"Patient: Jesus Trevino    Procedure Summary       Date: 03/28/25 Room / Location:     Anesthesia Start: 1139 Anesthesia Stop: 2154    Procedure: LABOR ANALGESIA Diagnosis:     Scheduled Providers:  Provider: Tay Pierre CRNA    Anesthesia Type: epidural ASA Status: 2            Anesthesia Type: epidural    Vitals  Vitals Value Taken Time   /55 03/29/25 12:15   Temp 98.4 °F (36.9 °C) 03/29/25 12:15   Pulse 73 03/29/25 12:15   Resp 16 03/29/25 12:15   SpO2 97 % 03/29/25 12:15           Post Anesthesia Care and Evaluation    Patient location during evaluation: floor  Patient participation: complete - patient participated  Level of consciousness: awake and alert  Pain management: satisfactory to patient    Airway patency: patent  Anesthetic complications: No anesthetic complications  PONV Status: none  Cardiovascular status: acceptable  Respiratory status: acceptable  Post Neuraxial Block status: Motor and sensory function returned to baseline and No signs or symptoms of PDPH  Comments: Blood pressure 123/55, pulse 73, temperature 98.4 °F (36.9 °C), temperature source Temporal, resp. rate 16, height 175.3 cm (69\"), weight 91.6 kg (202 lb), last menstrual period 07/15/2024, SpO2 97%, currently breastfeeding.       "

## 2025-03-29 NOTE — L&D DELIVERY NOTE
" [facility]   Vaginal Delivery Note    Patient Name: Jesus Trevino  : 1995  MRN: [id number]    Date of Delivery: 3/28/2025    Diagnosis     Pre & Post-Delivery:  Intrauterine pregnancy at 36w5d  Labor status: Spontaneous Onset of Labor    Normal labor and delivery             Problem List    Transfer to Postpartum     Review the Delivery Report for details.     Delivery     Delivery: Vaginal, Spontaneous    YOB: 2026   Time of Birth:  Gestational Age 9:54 PM  36w5d     Anesthesia: Epidural    Delivering clinician: Mark Ron   Forceps?   No   Vacuum? No    Shoulder dystocia present: No        Delivery narrative:  Progressed to C/C/+2 and pushed well to deliver a LVIF. DMITRY to ROT vigorous to mom's abdomen. Cord clamping delayed for 30 seconds. Placenta spontaneous and intact with 3VC after IV Pitocin.  Bilateral labial lacerations and first-degree vaginal laceration repaired with 2-0 and 3-0 Vicryl Rapide. Epidural anes. EBL 150mL. No complications. Sponge and needle count correct.       Infant     Findings: female infant     Infant observations: Weight: No birth weight on file.  Length:   in  Observations/Comments:        Apgars: 6  @ 1 minute /    9  @ 5 minutes   Infant Name:      Placenta & Cord         Placenta delivered  Spontaneous at   3/28/2025 10:00 PM    Cord: 3 vessels present.   Nuchal Cord?  no   Cord blood obtained: Yes   Cord gases obtained:  No   Cord gas results: Venous:  No results found for: \"PHCVEN\", \"BECVEN\"    Arterial:  No results found for: \"PHCART\", \"BECART\"     Repair     Episiotomy: None    No    Lacerations: Yes  Laceration Information  Laceration Repaired?   Perineal: 1st Yes   Periurethral:       Labial: bilateral Yes   Sulcus:       Vaginal:       Cervical:         Suture used for repair: 2-0 Vicryl     Estimated Blood Loss:       Quantitative Blood Loss:    QBL from VAG DEL: 99 (25)     Complications     none    Disposition     Mother " to Mother Baby/Postpartum  in stable condition currently.  Baby to remains with mom  in stable condition currently.    Mark Ron MD  03/28/25  22:26 CDT

## 2025-03-29 NOTE — PLAN OF CARE
Goal Outcome Evaluation:              Outcome Evaluation: VSS.  Fundus and lochia WDL.  Breastfeeding, pumpiong and supplementing with donor breast milk.  Bonding well with baby.  Pt using comfort products in room.

## 2025-03-29 NOTE — PROGRESS NOTES
"      Mark Ron MD  [facility]  [address] [address]  Office [phone]  Fax [phone]    [facility]  Vaginal Delivery Progress Note    Subjective   Postpartum Day 1: Vaginal Delivery    The patient feels well.  Her pain is well controlled with nonsteroidal anti-inflammatory drugs.   She is ambulating well.  Patient describes her bleeding as thin lochia.    Breastfeeding: infant latching without difficulty.    Objective     Vital Signs Range for the last 24 hours  Temperature: Temp:  [98 °F (36.7 °C)-99.3 °F (37.4 °C)] 98.6 °F (37 °C)   Temp Source: Temp src: Temporal   BP: BP: ()/() 121/59   Pulse: Heart Rate:  [] 72   Respirations: Resp:  [16-18] 18   SPO2: SpO2:  [97 %-100 %] 99 %   O2 Amount (l/min):     O2 Devices Device (Oxygen Therapy): room air   Weight:       Admit Height:  Height: 175.3 cm (69\")      Physical Exam:  General:  no acute distresss.  Abdomen: Fundus: appropriate, firm, non tender  Extremities: normal, atraumatic, no cyanosis, and trace edema.       Lab results reviewed:  Yes   Rubella:  No results found for: \"RUBELLAIGGIN\" Nurse Transcribed from prenatal record --  No components found for: \"EXTRUBELQUAL\"  Rh Status:    RH type   Date Value Ref Range Status   03/29/2025 Negative  Final     Immunizations:   Immunization History   Administered Date(s) Administered    COVID-19 (MODERNA) 1st,2nd,3rd Dose Monovalent 02/06/2021, 03/06/2021    DTaP, Unspecified 06/09/1998, 05/23/2006    Fluzone (or Fluarix & Flulaval for VFC) >6mos 10/02/2019    Fluzone Quad >6mos (Multi-dose) 03/03/2017    MMR 06/09/1998    Meningococcal Polysaccharide 05/23/2006    OPV 06/09/1998    Rho (D) Immune Globulin 01/30/2025    Tdap 10/02/2019, 02/13/2025    Varicella 06/09/1998     Lab Results (last 24 hours)       Procedure Component Value Units Date/Time    Hemoglobin & Hematocrit, Blood [[id number]]  (Abnormal) Collected: 03/29/25 0939    Specimen: Blood " Updated: 03/29/25 0951     Hemoglobin 9.9 g/dL      Hematocrit 29.5 %     Treponema pallidum AB w/Reflex RPR [[id number]]  (Normal) Collected: 03/28/25 0230    Specimen: Blood Updated: 03/28/25 1138     Treponemal AB Total Non-Reactive    Narrative:      Reactive results will reflex RPR testing.            External Prenatal Results       Pregnancy Outside Results - Transcribed From Office Records - See Scanned Records For Details       Test Value Date Time    ABO  A  03/29/25 0939    Rh  Negative  03/29/25 0939    Antibody Screen  Positive  03/29/25 0939       Positive  03/28/25 0230       Negative  01/30/25 0815       Negative  09/10/24 1331       Negative  08/16/24 1239    Varicella IgG       Rubella  1.05 index 09/10/24 1331    Hgb  9.9 g/dL 03/29/25 0939       11.6 g/dL 03/28/25 0230       11.1 g/dL 01/30/25 0815       11.5 g/dL 09/10/24 1331       13.0 g/dL 08/14/24 1431    Hct  29.5 % 03/29/25 0939       34.4 % 03/28/25 0230       35.8 % 09/10/24 1331       38.2 % 08/14/24 1431    HgB A1c        1h GTT  108 mg/dL 01/30/25 0815    3h GTT Fasting       3h GTT 1 hour       3h GTT 2 hour       3h GTT 3 hour        Gonorrhea (discrete)  Negative  09/10/24 1331    Chlamydia (discrete)  Negative  09/10/24 1331    RPR  Reactive  01/30/25 0815       Non Reactive  09/10/24 1331    Syphils cascade: TP-Ab (FTA)  Non-Reactive  03/28/25 0230       Non Reactive  02/05/25 1318    TP-Ab  Non-Reactive  03/28/25 0230       Non Reactive  02/05/25 1318    TP-Ab (EIA)       TPPA       HBsAg  Negative  09/10/24 1331    Herpes Simplex Virus PCR       Herpes Simplex VIrus Culture       HIV  Non Reactive  09/10/24 1331    Hep C RNA Quant PCR       Hep C Antibody  Non Reactive  09/10/24 1331    AFP       NIPT       Cystic Fibrosis (David)       Cystic Fibroisis        Spinal Muscular atrophy       Fragile X       Group B Strep       GBS Susceptibility to Clindamycin       GBS Susceptibility to Erythromycin       Fetal Fibronectin        Genetic Testing, Maternal Blood                 Drug Screening       Test Value Date Time    Urine Drug Screen       Amphetamine Screen       Barbiturate Screen       Benzodiazepine Screen       Methadone Screen       Phencyclidine Screen       Opiates Screen       THC Screen       Cocaine Screen       Propoxyphene Screen       Buprenorphine Screen       Methamphetamine Screen       Oxycodone Screen       Tricyclic Antidepressants Screen                 Legend    ^: Historical                            Assessment & Plan       Normal labor and delivery      Jesus Trevino is Day 1  post-partum  Vaginal, Spontaneous  .      Plan:  Continue current care.      Mark Ron MD  3/29/2025  10:48 CDT

## 2025-03-30 ENCOUNTER — APPOINTMENT (OUTPATIENT)
Dept: GENERAL RADIOLOGY | Facility: HOSPITAL | Age: 30
End: 2025-03-30
Payer: COMMERCIAL

## 2025-03-30 PROCEDURE — 93010 ELECTROCARDIOGRAM REPORT: CPT | Performed by: INTERNAL MEDICINE

## 2025-03-30 PROCEDURE — 71046 X-RAY EXAM CHEST 2 VIEWS: CPT

## 2025-03-30 PROCEDURE — 93005 ELECTROCARDIOGRAM TRACING: CPT | Performed by: NURSE PRACTITIONER

## 2025-03-30 RX ORDER — HYDROXYZINE HYDROCHLORIDE 25 MG/1
50 TABLET, FILM COATED ORAL EVERY 6 HOURS PRN
Status: DISCONTINUED | OUTPATIENT
Start: 2025-03-30 | End: 2025-03-31 | Stop reason: HOSPADM

## 2025-03-30 RX ADMIN — HYDROXYZINE HYDROCHLORIDE 50 MG: 25 TABLET ORAL at 00:51

## 2025-03-30 RX ADMIN — SERTRALINE HYDROCHLORIDE 50 MG: 50 TABLET ORAL at 12:20

## 2025-03-30 RX ADMIN — ACETAMINOPHEN 650 MG: 325 TABLET, FILM COATED ORAL at 20:44

## 2025-03-30 RX ADMIN — PRENATAL VIT W/ FE FUMARATE-FA TAB 27-0.8 MG 1 TABLET: 27-0.8 TAB at 08:28

## 2025-03-30 RX ADMIN — DOCUSATE SODIUM 100 MG: 100 CAPSULE, LIQUID FILLED ORAL at 20:41

## 2025-03-30 RX ADMIN — IBUPROFEN 600 MG: 600 TABLET ORAL at 18:16

## 2025-03-30 RX ADMIN — DOCUSATE SODIUM 100 MG: 100 CAPSULE, LIQUID FILLED ORAL at 08:28

## 2025-03-30 RX ADMIN — IBUPROFEN 600 MG: 600 TABLET ORAL at 00:34

## 2025-03-30 NOTE — PLAN OF CARE
Goal Outcome Evaluation:              Outcome Evaluation: Pt had chest tightness and bradycardia this shift-chest x-ray and EKG ordered.  No SOB or other S/S of distress noted-pt reports this has resolved.  Pt reports she believes it is from anxiety.  Fundus and lochia WDL.  Pt is attempting to breastfeeding, pumping and utilizing donor breast milk.  Zoloft restarted this shift and Atarax orders changed.  Bonding well with infant.

## 2025-03-30 NOTE — PROGRESS NOTES
"      Mark Ron MD  [facility]  [address] [place] [address]  Office [phone]  Fax [phone]    [facility]  Vaginal Delivery Progress Note    Subjective   Postpartum Day 2: Vaginal Delivery    The patient feels well.  Her pain is well controlled with nonsteroidal anti-inflammatory drugs.   She is ambulating well.  Patient describes her bleeding as thin lochia.    Breastfeeding: infant latching without difficulty.    Objective     Vital Signs Range for the last 24 hours  Temperature: Temp:  [97.5 °F (36.4 °C)-99 °F (37.2 °C)] 97.8 °F (36.6 °C)   Temp Source: Temp src: Temporal   BP: BP: (118-136)/(55-74) 136/71   Pulse: Heart Rate:  [57-90] 64   Respirations: Resp:  [16-18] 16   SPO2: SpO2:  [97 %-98 %] 97 %   O2 Amount (l/min):     O2 Devices Device (Oxygen Therapy): room air   Weight:       Admit Height:  Height: 175.3 cm (69\")      Physical Exam:  General:  no acute distresss.  Abdomen: Fundus: appropriate, firm, non tender  Extremities: normal, atraumatic, no cyanosis, and trace edema.       Lab results reviewed:  Yes   Rubella:  No results found for: \"RUBELLAIGGIN\" Nurse Transcribed from prenatal record --  No components found for: \"EXTRUBELQUAL\"  Rh Status:    RH type   Date Value Ref Range Status   03/29/2025 Negative  Final     Immunizations:   Immunization History   Administered Date(s) Administered    COVID-19 (MODERNA) 1st,2nd,3rd Dose Monovalent 02/06/2021, 03/06/2021    DTaP, Unspecified 06/09/1998, 05/23/2006    Fluzone (or Fluarix & Flulaval for VFC) >6mos 10/02/2019    Fluzone Quad >6mos (Multi-dose) 03/03/2017    MMR 06/09/1998    Meningococcal Polysaccharide 05/23/2006    OPV 06/09/1998    Rho (D) Immune Globulin 01/30/2025, 03/29/2025    Tdap 10/02/2019, 02/13/2025    Varicella 06/09/1998     Lab Results (last 24 hours)       ** No results found for the last 24 hours. **            External Prenatal Results       Pregnancy Outside Results - Transcribed From " Office Records - See Scanned Records For Details       Test Value Date Time    ABO  A  03/29/25 0939    Rh  Negative  03/29/25 0939    Antibody Screen  Positive  03/29/25 0939       Positive  03/28/25 0230       Negative  01/30/25 0815       Negative  09/10/24 1331       Negative  08/16/24 1239    Varicella IgG       Rubella  1.05 index 09/10/24 1331    Hgb  9.9 g/dL 03/29/25 0939       11.6 g/dL 03/28/25 0230       11.1 g/dL 01/30/25 0815       11.5 g/dL 09/10/24 1331       13.0 g/dL 08/14/24 1431    Hct  29.5 % 03/29/25 0939       34.4 % 03/28/25 0230       35.8 % 09/10/24 1331       38.2 % 08/14/24 1431    HgB A1c        1h GTT  108 mg/dL 01/30/25 0815    3h GTT Fasting       3h GTT 1 hour       3h GTT 2 hour       3h GTT 3 hour        Gonorrhea (discrete)  Negative  09/10/24 1331    Chlamydia (discrete)  Negative  09/10/24 1331    RPR  Reactive  01/30/25 0815       Non Reactive  09/10/24 1331    Syphils cascade: TP-Ab (FTA)  Non-Reactive  03/28/25 0230       Non Reactive  02/05/25 1318    TP-Ab  Non-Reactive  03/28/25 0230       Non Reactive  02/05/25 1318    TP-Ab (EIA)       TPPA       HBsAg  Negative  09/10/24 1331    Herpes Simplex Virus PCR       Herpes Simplex VIrus Culture       HIV  Non Reactive  09/10/24 1331    Hep C RNA Quant PCR       Hep C Antibody  Non Reactive  09/10/24 1331    AFP       NIPT       Cystic Fibrosis (David)       Cystic Fibroisis        Spinal Muscular atrophy       Fragile X       Group B Strep       GBS Susceptibility to Clindamycin       GBS Susceptibility to Erythromycin       Fetal Fibronectin       Genetic Testing, Maternal Blood                 Drug Screening       Test Value Date Time    Urine Drug Screen       Amphetamine Screen       Barbiturate Screen       Benzodiazepine Screen       Methadone Screen       Phencyclidine Screen       Opiates Screen       THC Screen       Cocaine Screen       Propoxyphene Screen       Buprenorphine Screen       Methamphetamine Screen        Oxycodone Screen       Tricyclic Antidepressants Screen                 Legend    ^: Historical                            Assessment & Plan       Normal labor and delivery      Jesus Trevino is Day 2  post-partum  Vaginal, Spontaneous  .      Plan:  Discharge home with standard precautions and return to clinic in 4-6 weeks.      Mark Ron MD  3/30/2025  11:03 CDT

## 2025-03-30 NOTE — NURSING NOTE
Pt complaint of chest tightness still.  No SOB or other S/S of distress noted. .  Call placed to MERCEDES Tang again at this time.  Order received for EKG at this time.

## 2025-03-30 NOTE — NURSING NOTE
Pt complaint of chest tightness like she had earlier this morning on the previous shift.  No SOB or other S/S of distress noted. Call placed to on call provider MERCEDES Tang at this time.  Pt reports being on Zoloft prior to pregnancy for anxiety and feels like this is an anxiety attack.  Pt placed on continuous pulse ox at this time.

## 2025-03-30 NOTE — PLAN OF CARE
Goal Outcome Evaluation:  Plan of Care Reviewed With: patient           Outcome Evaluation: VSS except for bradycardia when sleeping. fundus wnl. pt had episode of heart palpitations, shortness of breath with an intermittent stabbing pain. motrin and hydroxyzine given and greatly improved. breastfeeding. bonding well w infant.RHOGAM given

## 2025-03-30 NOTE — DISCHARGE SUMMARY
[facility]    Mark Ron MD  [address]  [place] [address]  [phone]      Discharge Summary     Jayy Trevino  : 1995  MRN: [id number]  CSN: [id number]    Date of Admission: 3/28/2025   Date of Discharge:  3/30/2025   Delivering Physician: Mark Ron       Admission Diagnosis: Pregnancy [Z34.90]   Discharge Diagnosis: Pregnancy at 36w5d - delivered       Procedures: 3/28/2025 - Vaginal, Spontaneous      Hospital Course  Patient is a 30 y.o.  who at 36w5d had a vaginal birth.  Her postpartum course was without complications.  On PPD #2 she was ready for discharge.  She had normal lochia and pain well controlled with oral medications.    Infant  female fetus weighing 3010 g (6 lb 10.2 oz)  Apgars -  6 @ 1 minute /  9 @ 5 minutes.    Discharge labs  Lab Results   Component Value Date    WBC 12.43 (H) 2025    HGB 9.9 (L) 2025    HCT 29.5 (L) 2025     2025       Discharge Medications     Discharge Medications        Continue These Medications        Instructions Start Date   prenatal (CLASSIC) vitamin 28-0.8 MG tablet tablet  Generic drug: prenatal vitamin   Daily             Stop These Medications      CHOLINE PO     DHEA PO     Magnesium 125 MG capsule     omeprazole 20 MG capsule  Commonly known as: priLOSEC              External Prenatal Results       Pregnancy Outside Results - Transcribed From Office Records - See Scanned Records For Details       Test Value Date Time    ABO  A  25 0939    Rh  Negative  25 0939    Antibody Screen  Positive  25 0939       Positive  25 0230       Negative  25 0815       Negative  09/10/24 1331       Negative  24 1239    Varicella IgG       Rubella  1.05 index 09/10/24 1331    Hgb  9.9 g/dL 25 0939       11.6 g/dL 25 0230       11.1 g/dL 25 0815       11.5 g/dL 09/10/24 1331       13.0 g/dL 24 1431    Hct   29.5 % 03/29/25 0939       34.4 % 03/28/25 0230       35.8 % 09/10/24 1331       38.2 % 08/14/24 1431    HgB A1c        1h GTT  108 mg/dL 01/30/25 0815    3h GTT Fasting       3h GTT 1 hour       3h GTT 2 hour       3h GTT 3 hour        Gonorrhea (discrete)  Negative  09/10/24 1331    Chlamydia (discrete)  Negative  09/10/24 1331    RPR  Reactive  01/30/25 0815       Non Reactive  09/10/24 1331    Syphils cascade: TP-Ab (FTA)  Non-Reactive  03/28/25 0230       Non Reactive  02/05/25 1318    TP-Ab  Non-Reactive  03/28/25 0230       Non Reactive  02/05/25 1318    TP-Ab (EIA)       TPPA       HBsAg  Negative  09/10/24 1331    Herpes Simplex Virus PCR       Herpes Simplex VIrus Culture       HIV  Non Reactive  09/10/24 1331    Hep C RNA Quant PCR       Hep C Antibody  Non Reactive  09/10/24 1331    AFP       NIPT       Cystic Fibrosis (David)       Cystic Fibroisis        Spinal Muscular atrophy       Fragile X       Group B Strep       GBS Susceptibility to Clindamycin       GBS Susceptibility to Erythromycin       Fetal Fibronectin       Genetic Testing, Maternal Blood                 Drug Screening       Test Value Date Time    Urine Drug Screen       Amphetamine Screen       Barbiturate Screen       Benzodiazepine Screen       Methadone Screen       Phencyclidine Screen       Opiates Screen       THC Screen       Cocaine Screen       Propoxyphene Screen       Buprenorphine Screen       Methamphetamine Screen       Oxycodone Screen       Tricyclic Antidepressants Screen                 Legend    ^: Historical                            Discharge Disposition Home or Self Care   Condition on Discharge: good   Follow-up: 6 weeks with Ariadne Ron MD  3/30/2025

## 2025-03-31 VITALS
RESPIRATION RATE: 18 BRPM | DIASTOLIC BLOOD PRESSURE: 75 MMHG | TEMPERATURE: 98.2 F | WEIGHT: 202 LBS | BODY MASS INDEX: 29.92 KG/M2 | SYSTOLIC BLOOD PRESSURE: 136 MMHG | HEIGHT: 69 IN | OXYGEN SATURATION: 98 % | HEART RATE: 51 BPM

## 2025-03-31 PROBLEM — Z3A.36 36 WEEKS GESTATION OF PREGNANCY: Status: ACTIVE | Noted: 2025-03-31

## 2025-03-31 LAB
C TRACH RRNA SPEC QL NAA+PROBE: NEGATIVE
GP B STREP DNA SPEC QL NAA+PROBE: POSITIVE
N GONORRHOEA RRNA SPEC QL NAA+PROBE: NEGATIVE
QT INTERVAL: 408 MS
QTC INTERVAL: 376 MS

## 2025-03-31 PROCEDURE — 0503F POSTPARTUM CARE VISIT: CPT | Performed by: OBSTETRICS & GYNECOLOGY

## 2025-03-31 RX ORDER — ACETAMINOPHEN 325 MG/1
650 TABLET ORAL EVERY 6 HOURS PRN
Qty: 60 TABLET | Refills: 0 | Status: SHIPPED | OUTPATIENT
Start: 2025-03-31

## 2025-03-31 RX ORDER — IBUPROFEN 600 MG/1
600 TABLET, FILM COATED ORAL EVERY 6 HOURS PRN
Qty: 40 TABLET | Refills: 0 | Status: SHIPPED | OUTPATIENT
Start: 2025-03-31

## 2025-03-31 RX ORDER — PSEUDOEPHEDRINE HCL 30 MG
100 TABLET ORAL 2 TIMES DAILY PRN
Qty: 30 CAPSULE | Refills: 0 | Status: SHIPPED | OUTPATIENT
Start: 2025-03-31

## 2025-03-31 RX ADMIN — DOCUSATE SODIUM 100 MG: 100 CAPSULE, LIQUID FILLED ORAL at 08:39

## 2025-03-31 RX ADMIN — SERTRALINE HYDROCHLORIDE 50 MG: 50 TABLET ORAL at 08:39

## 2025-03-31 RX ADMIN — IBUPROFEN 600 MG: 600 TABLET ORAL at 07:07

## 2025-03-31 RX ADMIN — PRENATAL VIT W/ FE FUMARATE-FA TAB 27-0.8 MG 1 TABLET: 27-0.8 TAB at 08:39

## 2025-03-31 NOTE — PLAN OF CARE
Goal Outcome Evaluation:  Plan of Care Reviewed With: patient, spouse        Progress: improving  Outcome Evaluation: VSS. FFMLUU, scant bleeding. atempting to breastfeed, now supplementing with formula. bonding well with infant. anxiety better per patient. intermintent mild chest tightness reported by pt.

## 2025-03-31 NOTE — LACTATION NOTE
Provided power pumping handout. Measured for flange fit, recommend 20 mm flange. Questions answered at bedside. Encouragement and support provided.    .lc

## 2025-03-31 NOTE — PAYOR COMM NOTE
"3/31/25 [facility] [phone]  [phone]      PATIENT ADMITTED TO INPATIENT ON 3/288/25. FAXING CLINICAL.            Jesus Trevino (30 y.o. Female)       Date of Birth   1995    Social Security Number       Address   [address] [place] [address] [place] [address]    Home Phone   [phone]    MRN   [id number]       Religious   Worship    Marital Status                               Admission Date   3/28/2025    Admission Type   Elective    Admitting Provider   Mark Ron MD    Attending Provider   Mark Ron MD    Department, Room/Bed   [facility] MOTHER BABY 2A, M235/1       Discharge Date       Discharge Disposition   Home or Self Care    Discharge Destination                                 Attending Provider: Mark Ron MD    Allergies: Raspberry    Isolation: None   Infection: None   Code Status: CPR    Ht: 175.3 cm (69\")   Wt: 91.6 kg (202 lb)    Admission Cmt: None   Principal Problem: Normal labor and delivery [O80]                   Active Insurance as of 3/28/2025       Primary Coverage       Payor Plan Insurance Group Employer/Plan Group    ANTHEM BLUE CROSS ANTHEM BLUE CROSS BLUE SHIELD PPO [id number]       Payor Plan Address Payor Plan Phone Number Payor Plan Fax Number Effective Dates    [address] [phone]  10/1/2024 - None Entered    [facility] [address]         Subscriber Name Subscriber Birth Date Member ID       HALLE TREVINO 1995 [id number]                     Emergency Contacts        (Rel.) Home Phone Work Phone Mobile Phone    EDI TREVINO (Spouse) -- -- [phone]    GualbertoRadha (Mother) -- -- [phone]    Ton Chiang (Father) -- -- [phone]             [facility] Encounter Date/Time: 3/28/2025 [facility]   Hospital Account: [id number]    MRN: [id number]   Patient:  Jesus Trevino   Contact Serial #: [id number]   SSN:          ENCOUNTER   "           Patient Class: Inpatient   Unit: [address] Service: Obstetrics     Bed: M235/1   Admitting Provider: Mark Ron MD   Referring Physician: Mark Ron   Attending Provider: Mark Ron MD   Adm Diagnosis: Pregnancy [Z34.90]               PATIENT             Name: Jesus Trevino : 1995 (30 yrs)   Address: [address] Sex: Female   City: [place] [address]   County: [facility]   Marital Status:  Ethnicity: NOT        Race: WHITE   Primary Care Provider: Uche Valle MD Patients Phone: Home Phone: [phone]     Mobile Phone: [phone]     EMERGENCY CONTACT   Contact Name Legal Guardian? Relationship to Patient Home Phone Work Phone Mobile Phone   1. EDI TREVINO  2. Radha Burciaga      Spouse  Mother           [phone]   GUARANTOR             Guarantor: Jesus Trevino     : 1995   Address: [address] Sex: [place] [address]     Relation to Patient: Self       Home Phone: [phone]   Guarantor ID: [id number]       Work Phone:     GUARANTOR EMPLOYER   Employer: [employer]         Status: FULL TIME   COVERAGE          PRIMARY INSURANCE   Payor: ANTHEM BLUE CROSS Plan: ANTHEM BLUE CROSS BLUE SHIELD PPO   Group Number: [id number] Insurance Type: INDEMNITY   Subscriber Name: HALLE TREVINO* Subscriber : 1995   Subscriber ID: [id number] Coverage Address: [facility] [id number] [place] [address]   Pat. Rel. to Subscriber: Spouse Coverage Phone: [phone]   SECONDARY INSURANCE   Payor: N/A Plan: N/A   Group Number:   Insurance Type:     Subscriber Name:   Subscriber :     Subscriber ID:   Coverage Address:     Pat. Rel. to Subscriber:   Coverage Phone:        Contact Serial # ([id number])         2025    Chart ID ([id number])            Mark Ron MD   Physician  OB Delivered     L&D Delivery Note     Signed     Date of Service:  "25  Creation Time: 25     Signed          [facility]   Vaginal Delivery Note     Patient Name: Jesus Trevino  : 1995  MRN: [id number]     Date of Delivery: 3/28/2025     Diagnosis      Pre & Post-Delivery:  Intrauterine pregnancy at 36w5d  Labor status: Spontaneous Onset of Labor    Normal labor and delivery                  Problem List    Transfer to Postpartum     Review the Delivery Report for details.      Delivery      Delivery: Vaginal, Spontaneous    YOB: 2026   Time of Birth:  Gestational Age 9:54 PM  36w5d      Anesthesia: Epidural    Delivering clinician: Mark Ron   Forceps?   No   Vacuum? No    Shoulder dystocia present: No                   Delivery narrative:  Progressed to C/C/+2 and pushed well to deliver a LVIF. DMITRY to ROT vigorous to mom's abdomen. Cord clamping delayed for 30 seconds. Placenta spontaneous and intact with 3VC after IV Pitocin.  Bilateral labial lacerations and first-degree vaginal laceration repaired with 2-0 and 3-0 Vicryl Rapide. Epidural anes. EBL 150mL. No complications. Sponge and needle count correct.         Infant      Findings: female infant      Infant observations: Weight: No birth weight on file.  Length:   in  Observations/Comments:        Apgars: 6  @ 1 minute /     9  @ 5 minutes   Infant Name:        Placenta & Cord            Placenta delivered  Spontaneous at   3/28/2025 10:00 PM    Cord: 3 vessels present.   Nuchal Cord?  no   Cord blood obtained: Yes   Cord gases obtained:             No   Cord gas results: Venous:  No results found for: \"PHCVEN\", \"BECVEN\"     Arterial:  No results found for: \"PHCART\", \"BECART\"      Repair      Episiotomy: None    No    Lacerations: Yes  Laceration Information  Laceration Repaired?   Perineal: 1st Yes   Periurethral:      Labial: bilateral Yes   Sulcus:      Vaginal:      Cervical:         Suture used for repair: 2-0 Vicryl      Estimated Blood Loss:     " "  Quantitative Blood Loss: QBL from VAG DEL: 99 (03/28/25 2209)      Complications      none     Disposition      Mother to Mother Baby/Postpartum  in stable condition currently.  Baby to remains with mom  in stable condition currently.     Mark Ron MD  03/28/25  22:26 CDT                     Mark Ron MD   Physician  OB Delivered     Progress Notes     Signed     Date of Service: 03/29/25 1048  Creation Time: 03/29/25 1048     Signed       Expand All Collapse All            Mark Ron MD  [facility]  [address] [facility] [address] [place] [address]  Office [phone]  Fax [phone]     [facility]  Vaginal Delivery Progress Note     Subjective  Postpartum Day 1: Vaginal Delivery     The patient feels well.  Her pain is well controlled with nonsteroidal anti-inflammatory drugs.   She is ambulating well.  Patient describes her bleeding as thin lochia.     Breastfeeding: infant latching without difficulty.     Objective[]Expand by Default  Vital Signs Range for the last 24 hours  Temperature: Temp:  [98 °F (36.7 °C)-99.3 °F (37.4 °C)] 98.6 °F (37 °C)   Temp Source: Temp src: Temporal   BP: BP: ()/() 121/59   Pulse: Heart Rate:  [] 72   Respirations: Resp:  [16-18] 18   SPO2: SpO2:  [97 %-100 %] 99 %   O2 Amount (l/min):    O2 Devices Device (Oxygen Therapy): room air   Weight:     Admit Height:  Height: 175.3 cm (69\")        Physical Exam:  General:  no acute distresss.  Abdomen: Fundus: appropriate, firm, non tender  Extremities: normal, atraumatic, no cyanosis, and trace edema.         Lab results reviewed:  Yes   Rubella:  No results found for: \"RUBELLAIGGIN\" Nurse Transcribed from prenatal record --  No components found for: \"EXTRUBELQUAL\"  Rh Status:          RH type   Date Value Ref Range Status   03/29/2025 Negative   Final      Immunizations:        Immunization History   Administered Date(s) Administered    COVID-19 (MODERNA) 1st,2nd,3rd Dose Monovalent " 02/06/2021, 03/06/2021    DTaP, Unspecified 06/09/1998, 05/23/2006    Fluzone (or Fluarix & Flulaval for VFC) >6mos 10/02/2019    Fluzone Quad >6mos (Multi-dose) 03/03/2017    MMR 06/09/1998    Meningococcal Polysaccharide 05/23/2006    OPV 06/09/1998    Rho (D) Immune Globulin 01/30/2025    Tdap 10/02/2019, 02/13/2025    Varicella 06/09/1998      Lab Results (last 24 hours)         Procedure Component Value Units Date/Time     Hemoglobin & Hematocrit, Blood [[id number]]  (Abnormal) Collected: 03/29/25 0939     Specimen: Blood Updated: 03/29/25 0951       Hemoglobin 9.9 g/dL         Hematocrit 29.5 %       Treponema pallidum AB w/Reflex RPR [[id number]]  (Normal) Collected: 03/28/25 0230     Specimen: Blood Updated: 03/28/25 1138       Treponemal AB Total Non-Reactive     Narrative:       Reactive results will reflex RPR testing.                External Prenatal Results         Pregnancy Outside Results - Transcribed From Office Records - See Scanned Records For Details         Test Value Date Time     ABO  A  03/29/25 0939     Rh  Negative  03/29/25 0939     Antibody Screen  Positive  03/29/25 0939        Positive  03/28/25 0230        Negative  01/30/25 0815        Negative  09/10/24 1331        Negative  08/16/24 1239     Varicella IgG           Rubella  1.05 index 09/10/24 1331     Hgb  9.9 g/dL 03/29/25 0939        11.6 g/dL 03/28/25 0230        11.1 g/dL 01/30/25 0815        11.5 g/dL 09/10/24 1331        13.0 g/dL 08/14/24 1431     Hct  29.5 % 03/29/25 0939        34.4 % 03/28/25 0230        35.8 % 09/10/24 1331        38.2 % 08/14/24 1431     HgB A1c            1h GTT  108 mg/dL 01/30/25 0815     3h GTT Fasting           3h GTT 1 hour           3h GTT 2 hour           3h GTT 3 hour            Gonorrhea (discrete)  Negative  09/10/24 1331     Chlamydia (discrete)  Negative  09/10/24 1331     RPR  Reactive  01/30/25 0815        Non Reactive  09/10/24 1331     Syphils cascade: TP-Ab (FTA)  Non-Reactive  03/28/25  0230        Non Reactive  02/05/25 1318     TP-Ab  Non-Reactive  03/28/25 0230        Non Reactive  02/05/25 1318     TP-Ab (EIA)           TPPA           HBsAg  Negative  09/10/24 1331     Herpes Simplex Virus PCR           Herpes Simplex VIrus Culture           HIV  Non Reactive  09/10/24 1331     Hep C RNA Quant PCR           Hep C Antibody  Non Reactive  09/10/24 1331     AFP           NIPT           Cystic Fibrosis (David)           Cystic Fibroisis            Spinal Muscular atrophy           Fragile X           Group B Strep           GBS Susceptibility to Clindamycin           GBS Susceptibility to Erythromycin           Fetal Fibronectin           Genetic Testing, Maternal Blood                        Drug Screening         Test Value Date Time     Urine Drug Screen           Amphetamine Screen           Barbiturate Screen           Benzodiazepine Screen           Methadone Screen           Phencyclidine Screen           Opiates Screen           THC Screen           Cocaine Screen           Propoxyphene Screen           Buprenorphine Screen           Methamphetamine Screen           Oxycodone Screen           Tricyclic Antidepressants Screen                        Legend    ^: Historical                                     Assessment & Plan    Normal labor and delivery        Jesus Trevino is Day 1  post-partum  Vaginal, Spontaneous  .       Plan:  Continue current care.        Mark Ron MD  3/29/2025  10:48 CDT                          Mark Ron MD   Physician  OB Delivered     Progress Notes     Signed     Date of Service: 03/30/25 1103  Creation Time: 03/30/25 1103     Signed       Expand All Collapse All            Mark Ron MD  [facility]  [address] [place] [address] [address] [place] [address]  Office [phone]  Fax [phone]     [facility]  Vaginal Delivery Progress Note     Subjective  Postpartum Day 2: Vaginal Delivery     The patient feels well.  Her pain is well  "controlled with nonsteroidal anti-inflammatory drugs.   She is ambulating well.  Patient describes her bleeding as thin lochia.     Breastfeeding: infant latching without difficulty.     Objective[]Expand by Default  Vital Signs Range for the last 24 hours  Temperature: Temp:  [97.5 °F (36.4 °C)-99 °F (37.2 °C)] 97.8 °F (36.6 °C)   Temp Source: Temp src: Temporal   BP: BP: (118-136)/(55-74) 136/71   Pulse: Heart Rate:  [57-90] 64   Respirations: Resp:  [16-18] 16   SPO2: SpO2:  [97 %-98 %] 97 %   O2 Amount (l/min):    O2 Devices Device (Oxygen Therapy): room air   Weight:     Admit Height:  Height: 175.3 cm (69\")        Physical Exam:  General:  no acute distresss.  Abdomen: Fundus: appropriate, firm, non tender  Extremities: normal, atraumatic, no cyanosis, and trace edema.         Lab results reviewed:  Yes   Rubella:  No results found for: \"RUBELLAIGGIN\" Nurse Transcribed from prenatal record --  No components found for: \"EXTRUBELQUAL\"  Rh Status:          RH type   Date Value Ref Range Status   03/29/2025 Negative   Final      Immunizations:        Immunization History   Administered Date(s) Administered    COVID-19 (MODERNA) 1st,2nd,3rd Dose Monovalent 02/06/2021, 03/06/2021    DTaP, Unspecified 06/09/1998, 05/23/2006    Fluzone (or Fluarix & Flulaval for VFC) >6mos 10/02/2019    Fluzone Quad >6mos (Multi-dose) 03/03/2017    MMR 06/09/1998    Meningococcal Polysaccharide 05/23/2006    OPV 06/09/1998    Rho (D) Immune Globulin 01/30/2025, 03/29/2025    Tdap 10/02/2019, 02/13/2025    Varicella 06/09/1998      Lab Results (last 24 hours)         ** No results found for the last 24 hours. **                External Prenatal Results         Pregnancy Outside Results - Transcribed From Office Records - See Scanned Records For Details         Test Value Date Time     ABO  A  03/29/25 0939     Rh  Negative  03/29/25 0939     Antibody Screen  Positive  03/29/25 0939        Positive  03/28/25 0230        Negative  " 01/30/25 0815        Negative  09/10/24 1331        Negative  08/16/24 1239     Varicella IgG           Rubella  1.05 index 09/10/24 1331     Hgb  9.9 g/dL 03/29/25 0939        11.6 g/dL 03/28/25 0230        11.1 g/dL 01/30/25 0815        11.5 g/dL 09/10/24 1331        13.0 g/dL 08/14/24 1431     Hct  29.5 % 03/29/25 0939        34.4 % 03/28/25 0230        35.8 % 09/10/24 1331        38.2 % 08/14/24 1431     HgB A1c            1h GTT  108 mg/dL 01/30/25 0815     3h GTT Fasting           3h GTT 1 hour           3h GTT 2 hour           3h GTT 3 hour            Gonorrhea (discrete)  Negative  09/10/24 1331     Chlamydia (discrete)  Negative  09/10/24 1331     RPR  Reactive  01/30/25 0815        Non Reactive  09/10/24 1331     Syphils cascade: TP-Ab (FTA)  Non-Reactive  03/28/25 0230        Non Reactive  02/05/25 1318     TP-Ab  Non-Reactive  03/28/25 0230        Non Reactive  02/05/25 1318     TP-Ab (EIA)           TPPA           HBsAg  Negative  09/10/24 1331     Herpes Simplex Virus PCR           Herpes Simplex VIrus Culture           HIV  Non Reactive  09/10/24 1331     Hep C RNA Quant PCR           Hep C Antibody  Non Reactive  09/10/24 1331     AFP           NIPT           Cystic Fibrosis (Dvaid)           Cystic Fibroisis            Spinal Muscular atrophy           Fragile X           Group B Strep           GBS Susceptibility to Clindamycin           GBS Susceptibility to Erythromycin           Fetal Fibronectin           Genetic Testing, Maternal Blood                        Drug Screening         Test Value Date Time     Urine Drug Screen           Amphetamine Screen           Barbiturate Screen           Benzodiazepine Screen           Methadone Screen           Phencyclidine Screen           Opiates Screen           THC Screen           Cocaine Screen           Propoxyphene Screen           Buprenorphine Screen           Methamphetamine Screen           Oxycodone Screen           Tricyclic Antidepressants  Screen                        Legend    ^: Historical                                     Assessment & Plan    Normal labor and delivery        Jesus Trevino is Day 2  post-partum  Vaginal, Spontaneous  .       Plan:  Discharge home with standard precautions and return to clinic in 4-6 weeks.        Mark Ron MD  3/30/2025  11:03 CDT                     Terrie Vital, RN   Registered Nurse  Nursing     Plan of Care     Signed     Date of Service: 25  Creation Time: 25     Signed         Goal Outcome Evaluation:  Outcome Evaluation: Pt had chest tightness and bradycardia this shift-chest x-ray and EKG ordered.  No SOB or other S/S of distress noted-pt reports this has resolved.  Pt reports she believes it is from anxiety.  Fundus and lochia WDL.  Pt is attempting to breastfeeding, pumping and utilizing donor breast milk.  Zoloft restarted this shift and Atarax orders changed.  Bonding well with infant.                    ate of Admission: 3/28/2025       Delivering Physician: aMrk Ron         Admission Diagnosis: Pregnancy [Z34.90]  36 weeks  labor      Discharge Diagnosis: Pregnancy at 36w5d - delivered  S/p          Procedures: 3/28/2025 - Vaginal, Spontaneous       Presenting Problem/History of Present Illness        Active Hospital Problems     Diagnosis   POA    **Normal labor and delivery [O80]   Not Applicable    36 weeks gestation of pregnancy [Z3A.36]   Not Applicable       Resolved Hospital Problems     Diagnosis Date Resolved POA       Current Facility-Administered Medications   Medication Dose Route Frequency Provider Last Rate Last Admin    acetaminophen (TYLENOL) tablet 650 mg  650 mg Oral Q6H PRN TrippMark henry MD   650 mg at 25    benzocaine-menthol (DERMOPLAST) 20-0.5 % topical spray   Topical PRN Mark Ron MD   Given at 25 0142    bisacodyl (DULCOLAX) suppository 10 mg  10 mg Rectal Daily PRN Mark Ron  MD        calcium carbonate (TUMS) chewable tablet 500 mg (200 mg elemental)  2 tablet Oral TID PRN Evans, Mark HURLEY MD        docusate sodium (COLACE) capsule 100 mg  100 mg Oral BID Evans, Mark HURLEY MD   100 mg at 03/31/25 0839    famotidine (PEPCID) injection 20 mg  20 mg Intravenous Once PRN Tay Pierre CRNA        Hydrocortisone (Perianal) (ANUSOL-HC) 2.5 % rectal cream 1 Application  1 Application Rectal PRN Evans, Mark HURLEY MD        hydrOXYzine (ATARAX) tablet 50 mg  50 mg Oral Q6H PRN Tatyana Becker APRN        ibuprofen (ADVIL,MOTRIN) tablet 600 mg  600 mg Oral Q6H PRN Evans, Mark HURLEY MD   600 mg at 03/31/25 0707    magnesium hydroxide (MILK OF MAGNESIA) suspension 10 mL  10 mL Oral Daily PRN Evans, Mark HURLEY MD        methylergonovine (METHERGINE) injection 200 mcg  200 mcg Intramuscular Once PRN Evans, Mark HURLEY MD        miSOPROStol (CYTOTEC) tablet 600 mcg  600 mcg Oral Once PRN Evans, Mark HURLEY MD        ondansetron (ZOFRAN) injection 4 mg  4 mg Intravenous Q6H PRN Evans, Mark HURLEY MD        ondansetron ODT (ZOFRAN-ODT) disintegrating tablet 4 mg  4 mg Oral Q8H PRN Evans, Mark HURLEY MD        oxytocin (PITOCIN) 30 units in 0.9% sodium chloride 500 mL (premix)  999 mL/hr Intravenous Once Maribel Culp MD        oxytocin (PITOCIN) 30 units in 0.9% sodium chloride 500 mL (premix)  125 mL/hr Intravenous Once PRN Evans, Mark HURLEY MD        pramoxine-hydrocortisone 1-1 % foam   Rectal PRN Evans, Mark HURLEY MD        prenatal vitamin tablet 1 tablet  1 tablet Oral Daily Evans, Mark HURLEY MD   1 tablet at 03/31/25 0839    promethazine (PHENERGAN) tablet 25 mg  25 mg Oral Q6H PRN Evans, Mark HURLEY MD        Or    promethazine (PHENERGAN) suppository 12.5 mg  12.5 mg Rectal Q6H PRN Evans, Mark HURLEY MD        sertraline (ZOLOFT) tablet 50 mg  50 mg Oral Daily Tatyana Becker APRN   50 mg at 03/31/25 1257    sodium chloride 0.9 % flush 1-10 mL   1-10 mL Intravenous PRN [place], Mark HURLEY MD        traMADol (ULTRAM) tablet 50 mg  50 mg Oral Q6H PRN [place], Mark HURLEY MD

## 2025-03-31 NOTE — LACTATION NOTE
Mother's Name: Jesus Phone #:[phone]  Infant Name: Osmin  :2025 @2154  Gestation: 36w5d  Day of life:3   Birth weight:  6-10.2 (3010g) Katharine+ Discharge weight:6-3.3 (2815g)  Weight Loss: -6.48%  24 hour Summary of Feeds: 2BF (short) attempt x2 EBM 5 ml DBM 57 ml Formula 45 ml Voids: 5 Stools:1   Emesis:1  Assistive devices (shields, shells, etc): NA  Significant Maternal history:, anxiety, depression, anorexia, bulemia, GERD  Maternal Concerns:  denies  Maternal Goal: exclusive breastfeeding  Mother's Medications: choline, Magnesium, prilosec, prasterone, PNV  Breastpump for home: RX faxed to [facility]  Ped follow up appt:    MD reports mother had a stressful day yesterday but seems better this morning, will have follow up with her this week. To room to offer further education and support. Reviewed feedings, feeding plan and reassess mothers goals. Mother voices strong desires to  exclusively breastfeed. Reiterated the importance of prioritizing breastfeeding and pumping. Also reassurance of formula supplementing goal is to be short term until mother's milk transitions. Advise for feeding plan until follow up with Eloisa NEELY tomorrow: breastfeed for 15 mins on 1 breast, pump for 15-20 mins and FOB to supplement infant using PACED feeding method during daytime feedings, at night advise to pump and bottle feed. Also offer option to incorporate power pumping twice per day to trigger earlier, more abundant milk transition. Breastfeeding after discharge handout given and reviewed. Demonstrated paced bottle feeding. Much encouragement and reassurance offered to mother and FOB of these challenges with  infant but will improve as infant grows and matures. Encouraged to protect milk supply early on while continue to work on direct breastfeeding. Follow up with FLORINDA Gamboa  at 1400, instructions provided for appt. Also updated Dr. Buckley on lactation follow up appt. Also urged  patient and spouse to advocate for rest and bonding, as they have voiced previous days have been overwhelming and stressful with multiple visitors in addition to infant feeding poorly, 2 episodes of hypoglycemia and spitting up. Per RN staff, mother experienced a couple episodes of anxiety attacks yesterday requiring work up as well. Reiterated to parents, to also be mindful of signs of PPD and to reach out to provider if concerns arise. Much encouragement and ongoing support offered.       Instructed patient our lactation team is here for continued support throughout their breastfeeding journey. Our team has encouraged patient to call with any questions or concerns that may arise after discharge.     Signs of Milk: Fullness, firmness, heaviness of breasts, leaking of milk.  Signs of Good Feed: Breast fullness prior to feed, breasts soft and comfortable after feeding. Infant content after feeding: calm, sleepy, relaxed and without continued hunger cues.  Signs of Plugged Ducts, Engorgement and Mastitis: Plugged ducts (milk entrapment in milk ducts)- small tender knots that often feel like little beans under breast tissue, usually tender. Massage on these areas of concern while breastfeeding or pumping to promote emptying.   Engorgement- fluid or excess milk, breasts become uncomfortably full, tight, firm (compare to the firmness of your cheek (mild), chin (moderate) or forehead (severe). First line of treatment should be to BREASTFEED, if breasts remain full feeling after a feeding, it may be necessary to pump, ONLY UNTIL BREASTS ARE SOFT AND COMFORTABLE. DO NOT OVER PUMP (complete emptying of breasts can trigger even more milk which will cause continued, recurrent Engorgement).  Mastitis- Infection of the breast tissue, most often caused by plugged ducts that are not adequately treated by emptying, recurrent trauma to nipples or breasts (cracked or bleeding nipples). Signs: redness, swelling, tender knots or fever  to breasts as well as generalized fever >101 degrees F that is often sudden onset. Treatment of mastitis, BREASTFEED! Pump after breastfeeding to achieve COMPLETE emptying of affected breast, utilizing massage to areas of concern, may use cold compress to affected area only after breast emptying. May take anti-inflammatories i.e. Ibuprofen, Motrin. CALL your OB for assessment and continued treatment with Antibiotics to adequately treat mastitis.  Infant Care: Over the first 2 weeks it is important to keep record of infant's feeding routine (feeding times and durations), wet and dirty diaper frequency, stool color and any spit ups that may occur.  Keep in mind, ALL babies will lose some weight initially (usually no more than 10% by day 3). Until infant returns to/ surpasses birth weight (which can take up to 2 weeks), it is important to offer feedings AT LEAST EVERY 3 HOURS. Remember, if you choose to supplement infant with formula or previously pumped milk, you should always pump in replacement of that feeding in order to promote and maintain a healthy milk supply!  Maternal Care: REST, sleep when the infant sleeps, stay hydrated (water is optimal) drink to thirst, increase caloric intake - breastfeeding mother's need an ADDITIONAL 500 calories per day , eat 3 meals/day as well as snacks in between, limit CAFFIENE intake to 2 cups/day. Ask your significant other, family members or friends for help when needed, taking advantage of meal trains, allowing others to help with laundry, house chores, etc can help you focus on what is most important early on after delivery… you and your infant, and breastfeeding!   Medications to CONTINUE: Prenatal Vitamins are important to continue taking while breastfeeding. Fish oil, magnesium/calcium supplements often are helpful to support Mothers and their milk supply as well. Tylenol, Ibuprofen, regular Zyrtec, Claritin are SAFE if you suffer from seasonal allergies. Flonase is safe  and often an effective medication to take if suffering from sinus drainage/pressure.  Medications to AVOID: Benadryl, Sudafed, any medications including “DM” or have a drying effect to sinus drainage will also dry a mother's milk up. Birth control- your OB will want to address birth control options with you usually around 4-6 weeks postpartum, be sure to notify your MD if you continue to breastfeed as some birth controls may significantly decrease your milk supply. Herbals- some herbs may also decrease your milk supply: PEPPERMINT, MENTHOL in any form (candies, essential oils, teas, etc), so check labels and avoid using in excess.  Pumping: Although we encourage you to focus on breastfeeding over the first 2-4 weeks, you will need to plan to begin pumping. We do recommend implementing pumping by the time infant is 4 weeks old. Pump 2-3 times per day immediately AFTER breastfeeding, it is normal to collect very small amounts initially, but the more consistently you pump, the more you will begin to collect. Store collected milk in refrigerator or freezer. You should also begin offering infant a bottle around 4 weeks. Remember to use slow flow nipples and PACE the bottle-feed. A bottle feed should take about as long as a breastfeeding session.

## 2025-03-31 NOTE — DISCHARGE SUMMARY
[facility] [place] [facility]    Sanchez San MD  [address]  [place] [address]  [phone]      Discharge Summary      Jesus Trevino  : 1995  MRN: [id number]  CSN: [id number]    Date of Admission: 3/28/2025   Date of Discharge:  3/31/2025   Delivering Physician: Mark Ron       Admission Diagnosis: Pregnancy [Z34.90]  36 weeks  labor     Discharge Diagnosis: Pregnancy at 36w5d - delivered  S/p        Procedures: 3/28/2025 - Vaginal, Spontaneous      Presenting Problem/History of Present Illness  Active Hospital Problems    Diagnosis  POA    **Normal labor and delivery [O80]  Not Applicable    36 weeks gestation of pregnancy [Z3A.36]  Not Applicable      Resolved Hospital Problems    Diagnosis Date Resolved POA    Pregnancy [Z34.90] 2025 Not Applicable        Hospital Course  Patient is a 30 y.o.  who at 36w5d had a vaginal birth.  Her postpartum course was without complications.  On PPD #3 she was ready for discharge. Denies chest pain/shortness of breath. Anxiety improved.  She had normal lochia and pain well controlled with oral medications. The patient feels well.   She is ambulating well.  Patient describes her bleeding as thin lochia.    Breastfeeding: infant latching.    Infant  female fetus weighing 3010 g (6 lb 10.2 oz)  Apgars -  6 @ 1 minute /  9 @ 5 minutes.    Procedures Performed         Consults:   Consults       No orders found from 2025 to 3/29/2025.            Pertinent Test Results:   CBC          2025    08:15 3/28/2025    02:30 3/29/2025    09:39   CBC   WBC  12.43     RBC  3.87     Hemoglobin 11.1  11.6  9.9    Hematocrit  34.4  29.5    MCV  88.9     MCH  30.0     MCHC  33.7     RDW  13.9     Platelets  208         Condition on Discharge:  Stable    Temp:  [98.2 °F (36.8 °C)-98.9 °F (37.2 °C)] 98.2 °F (36.8 °C)  Heart Rate:  [51-82] 51  Resp:  [18-20] 18  BP: (117-139)/(62-81) 136/75    Physical Exam:    General Alert, No acute  distress, non-toxic appearing    Lungs Non-labored, symmetrical chest rise   Abdomen abdomen is soft without significant tenderness, masses, organomegaly or guarding. Fundus: appropriate, firm, non tender   Extremities  extremities normal, atraumatic, no cyanosis or edema       Result Review   CBC          2025    08:15 3/28/2025    02:30 3/29/2025    09:39   CBC   WBC  12.43     RBC  3.87     Hemoglobin 11.1  11.6  9.9    Hematocrit  34.4  29.5    MCV  88.9     MCH  30.0     MCHC  33.7     RDW  13.9     Platelets  208           Postpartum Depression: Medium Risk (3/30/2025)    [place]  Depression Scale     Last EPDS Total Score: 7     Last EPDS Self Harm Result: Never     XR Chest PA & Lateral  Result Date: 3/30/2025  Shallow inspiration, no acute cardiopulmonary abnormality.   This report was signed and finalized on 3/30/2025 2:37 PM by Dr. Uche Mathis MD.       25 13:17   ECG 12 Lead Rpt   Rpt: View report in Results Review for more information  Sinus bradycardia with sinus arrhythmia  Otherwise normal ECG  No previous ECGs available         Discharge Disposition  Home or Self Care    Discharge Medications     Discharge Medications        New Medications        Instructions Start Date   acetaminophen 325 MG tablet  Commonly known as: TYLENOL   650 mg, Oral, Every 6 Hours PRN      docusate sodium 100 MG capsule   100 mg, Oral, 2 Times Daily PRN      ibuprofen 600 MG tablet  Commonly known as: ADVIL,MOTRIN   600 mg, Oral, Every 6 Hours PRN             Continue These Medications        Instructions Start Date   prenatal (CLASSIC) vitamin 28-0.8 MG tablet tablet  Generic drug: prenatal vitamin   Daily             Stop These Medications      CHOLINE PO     DHEA PO     Magnesium 125 MG capsule     omeprazole 20 MG capsule  Commonly known as: priLOSEC              Discharge Diet: home diet    Activity at Discharge: pelvic rest    Follow-up Appointments  Future Appointments   Date Time Provider  Department Center   4/1/2025  2:00 PM [facility]   5/15/2025 11:15 AM Mark Ron MD [facility]  [facility]       Follow-up for postpartum visit in 4-6 weeks    Test Results Pending at Discharge  Pending Results       None             Sanchez San MD  03/31/25  09:41 CDT    Time: Discharge <30 min

## 2025-04-01 ENCOUNTER — TELEPHONE (OUTPATIENT)
Dept: OBSTETRICS AND GYNECOLOGY | Age: 30
End: 2025-04-01
Payer: COMMERCIAL

## 2025-04-01 ENCOUNTER — MATERNAL SCREENING (OUTPATIENT)
Dept: CALL CENTER | Facility: HOSPITAL | Age: 30
End: 2025-04-01
Payer: COMMERCIAL

## 2025-04-01 ENCOUNTER — HOSPITAL ENCOUNTER (OUTPATIENT)
Dept: LACTATION | Facility: HOSPITAL | Age: 30
Discharge: HOME | End: 2025-04-01
Payer: COMMERCIAL

## 2025-04-01 NOTE — TELEPHONE ENCOUNTER
Pt calling to report fever, chills, breast enlargement, and pain in breast. Pt reports she had spoken with lactation and was told she would need to follow up with her OBGYN office for evaluation. Pt delivered 3/28/25. Pt reports she has tried tylenol for symptoms with little relief. Pt advised to go to urgent care to evaluate symptoms and to return phone call to office for update. Pt voices understanding.

## 2025-04-01 NOTE — PAYOR COMM NOTE
"[place] [facility] 3-31-25    [place], Jesus (30 y.o. Female)       Date of Birth   1995    Social Security Number       Address   [address] [place] [id number]    Home Phone   [phone]    MRN   [id number]       Jewish   Scientologist    Marital Status                               Admission Date   3/28/2025    Admission Type   Elective    Admitting Provider   Mark Ron MD    Attending Provider       Department, Room/Bed   [facility] MOTHER BABY 2A, M235/1       Discharge Date   3/31/2025    Discharge Disposition   Home or Self Care    Discharge Destination                                 Attending Provider: (none)   Allergies: Raspberry    Isolation: None   Infection: None   Code Status: Prior    Ht: 175.3 cm (69\")   Wt: 91.6 kg (202 lb)    Admission Cmt: None   Principal Problem: Normal labor and delivery [O80]                   Active Insurance as of 3/28/2025       Primary Coverage       Payor Plan Insurance Group Employer/Plan Group    [employer]HEM BLUE CROSS ANTHEM BLUE CROSS BLUE SHIELD PPO [id number]       Payor Plan Address Payor Plan Phone Number Payor Plan Fax Number Effective Dates    [address] [address] [phone]  10/1/2024 - None Entered    [facility] [id number]         Subscriber Name Subscriber Birth Date Member ID       HALLE TREVINO 1995 [id number]                     Emergency Contacts        (Rel.) Home Phone Work Phone Mobile Phone    EDI TREVINO (Spouse) -- -- [phone]    GualbertoRadha (Mother) -- -- [phone]    MariiaTon (Father) -- -- [phone]                 Discharge Summary        Mark Ron MD at 25 1104          [facility]    Mark Ron MD  [address]  [place] [id number]  [phone]      Discharge Summary     Jayy Trevino  : 1995  MRN: [id number]  CSN: [id number]    Date of Admission: 3/28/2025   Date of Discharge:  3/30/2025 "   Delivering Physician: Mark Ron       Admission Diagnosis: Pregnancy [Z34.90]   Discharge Diagnosis: Pregnancy at 36w5d - delivered       Procedures: 3/28/2025 - Vaginal, Spontaneous      Hospital Course  Patient is a 30 y.o.  who at 36w5d had a vaginal birth.  Her postpartum course was without complications.  On PPD #2 she was ready for discharge.  She had normal lochia and pain well controlled with oral medications.    Infant  female fetus weighing 3010 g (6 lb 10.2 oz)  Apgars -  6 @ 1 minute /  9 @ 5 minutes.    Discharge labs  Lab Results   Component Value Date    WBC 12.43 (H) 2025    HGB 9.9 (L) 2025    HCT 29.5 (L) 2025     2025       Discharge Medications     Discharge Medications        Continue These Medications        Instructions Start Date   prenatal (CLASSIC) vitamin 28-0.8 MG tablet tablet  Generic drug: prenatal vitamin   Daily             Stop These Medications      CHOLINE PO     DHEA PO     Magnesium 125 MG capsule     omeprazole 20 MG capsule  Commonly known as: priLOSEC              External Prenatal Results       Pregnancy Outside Results - Transcribed From Office Records - See Scanned Records For Details       Test Value Date Time    ABO  A  25 0939    Rh  Negative  25 0939    Antibody Screen  Positive  25 0939       Positive  25 0230       Negative  25 0815       Negative  09/10/24 1331       Negative  24 1239    Varicella IgG       Rubella  1.05 index 09/10/24 1331    Hgb  9.9 g/dL 25 0939       11.6 g/dL 25 0230       11.1 g/dL 25 0815       11.5 g/dL 09/10/24 1331       13.0 g/dL 24 1431    Hct  29.5 % 25 0939       34.4 % 25 0230       35.8 % 09/10/24 1331       38.2 % 24 1431    HgB A1c        1h GTT  108 mg/dL 25 0815    3h GTT Fasting       3h GTT 1 hour       3h GTT 2 hour       3h GTT 3 hour        Gonorrhea (discrete)  Negative  09/10/24 1331     Chlamydia (discrete)  Negative  09/10/24 1331    RPR  Reactive  25 0815       Non Reactive  09/10/24 1331    Syphils cascade: TP-Ab (FTA)  Non-Reactive  25 0230       Non Reactive  25 1318    TP-Ab  Non-Reactive  25 0230       Non Reactive  25 1318    TP-Ab (EIA)       TPPA       HBsAg  Negative  09/10/24 1331    Herpes Simplex Virus PCR       Herpes Simplex VIrus Culture       HIV  Non Reactive  09/10/24 1331    Hep C RNA Quant PCR       Hep C Antibody  Non Reactive  09/10/24 1331    AFP       NIPT       Cystic Fibrosis (David)       Cystic Fibroisis        Spinal Muscular atrophy       Fragile X       Group B Strep       GBS Susceptibility to Clindamycin       GBS Susceptibility to Erythromycin       Fetal Fibronectin       Genetic Testing, Maternal Blood                 Drug Screening       Test Value Date Time    Urine Drug Screen       Amphetamine Screen       Barbiturate Screen       Benzodiazepine Screen       Methadone Screen       Phencyclidine Screen       Opiates Screen       THC Screen       Cocaine Screen       Propoxyphene Screen       Buprenorphine Screen       Methamphetamine Screen       Oxycodone Screen       Tricyclic Antidepressants Screen                 Legend    ^: Historical                            Discharge Disposition Home or Self Care   Condition on Discharge: good   Follow-up: 6 weeks with Ariadne Ron MD  3/30/2025       Electronically signed by Mark Ron MD at 25 1104       Sanchez San MD at 25 0964          [facility]    Sanchez San MD  [address] [place], [place] [address]  [phone]      Discharge Summary      Jesus Trevino  : 1995  MRN: [id number]  CSN: [id number]    Date of Admission: 3/28/2025   Date of Discharge:  3/31/2025   Delivering Physician: Mark Ron       Admission Diagnosis: Pregnancy [Z34.90]  36 weeks  labor     Discharge Diagnosis:  Pregnancy at 36w5d - delivered  S/p        Procedures: 3/28/2025 - Vaginal, Spontaneous      Presenting Problem/History of Present Illness  Active Hospital Problems    Diagnosis  POA    **Normal labor and delivery [O80]  Not Applicable    36 weeks gestation of pregnancy [Z3A.36]  Not Applicable      Resolved Hospital Problems    Diagnosis Date Resolved POA    Pregnancy [Z34.90] 2025 Not Applicable        Hospital Course  Patient is a 30 y.o.  who at 36w5d had a vaginal birth.  Her postpartum course was without complications.  On PPD #3 she was ready for discharge. Denies chest pain/shortness of breath. Anxiety improved.  She had normal lochia and pain well controlled with oral medications. The patient feels well.   She is ambulating well.  Patient describes her bleeding as thin lochia.    Breastfeeding: infant latching.    Infant  female fetus weighing 3010 g (6 lb 10.2 oz)  Apgars -  6 @ 1 minute /  9 @ 5 minutes.    Procedures Performed         Consults:   Consults       No orders found from 2025 to 3/29/2025.            Pertinent Test Results:   CBC          2025    08:15 3/28/2025    02:30 3/29/2025    09:39   CBC   WBC  12.43     RBC  3.87     Hemoglobin 11.1  11.6  9.9    Hematocrit  34.4  29.5    MCV  88.9     MCH  30.0     MCHC  33.7     RDW  13.9     Platelets  208         Condition on Discharge:  Stable    Temp:  [98.2 °F (36.8 °C)-98.9 °F (37.2 °C)] 98.2 °F (36.8 °C)  Heart Rate:  [51-82] 51  Resp:  [18-20] 18  BP: (117-139)/(62-81) 136/75    Physical Exam:    General Alert, No acute distress, non-toxic appearing    Lungs Non-labored, symmetrical chest rise   Abdomen abdomen is soft without significant tenderness, masses, organomegaly or guarding. Fundus: appropriate, firm, non tender   Extremities  extremities normal, atraumatic, no cyanosis or edema       Result Review   CBC          2025    08:15 3/28/2025    02:30 3/29/2025    09:39   CBC   WBC  12.43     RBC  3.87      Hemoglobin 11.1  11.6  9.9    Hematocrit  34.4  29.5    MCV  88.9     MCH  30.0     MCHC  33.7     RDW  13.9     Platelets  208           Postpartum Depression: Medium Risk (3/30/2025)    [place]  Depression Scale     Last EPDS Total Score: 7     Last EPDS Self Harm Result: Never     XR Chest PA & Lateral  Result Date: 3/30/2025  Shallow inspiration, no acute cardiopulmonary abnormality.   This report was signed and finalized on 3/30/2025 2:37 PM by Dr. Uche Mathis MD.       25 13:17   ECG 12 Lead Rpt   Rpt: View report in Results Review for more information  Sinus bradycardia with sinus arrhythmia  Otherwise normal ECG  No previous ECGs available        Discharge Disposition  Home or Self Care    Discharge Medications     Discharge Medications        New Medications        Instructions Start Date   acetaminophen 325 MG tablet  Commonly known as: TYLENOL   650 mg, Oral, Every 6 Hours PRN      docusate sodium 100 MG capsule   100 mg, Oral, 2 Times Daily PRN      ibuprofen 600 MG tablet  Commonly known as: ADVIL,MOTRIN   600 mg, Oral, Every 6 Hours PRN             Continue These Medications        Instructions Start Date   prenatal (CLASSIC) vitamin 28-0.8 MG tablet tablet  Generic drug: prenatal vitamin   Daily             Stop These Medications      CHOLINE PO     DHEA PO     Magnesium 125 MG capsule     omeprazole 20 MG capsule  Commonly known as: priLOSEC              Discharge Diet: home diet    Activity at Discharge: pelvic rest    Follow-up Appointments  Future Appointments   Date Time Provider Department Center   2025  2:00 PM [facility]   5/15/2025 11:15 AM Mark Ron MD [facility]  [facility]       Follow-up for postpartum visit in 4-6 weeks    Test Results Pending at Discharge  Pending Results       None             Sanchez San MD  25  09:41 CDT    Time: Discharge <30 min            Electronically signed by Sanchez San MD at 25 0928

## 2025-04-01 NOTE — OUTREACH NOTE
Maternal Screening Survey      Flowsheet Row Responses   Eligibility Eligible   Prep survey completed? Yes   Facility patient discharged from? [facility]

## 2025-04-01 NOTE — LACTATION NOTE
Mother's Name: Jesus Trevino  G/P:   1/1  Significant Medical History:  anxiety, depression, anorexia, bulemia, GERD  Maternal Breast Assessment:  Bilateral red breasts, RLULOQ's, breasts hard and hot. Pt with extreme fatigue, chills, body aches, generally not feeling well. Milk not easily expressed.     Infant's Name:  Osmin Trevino  Date of Birth:   [birth year]  Gestational age at Birth: 36-5  Age:    4 days  Physician:   FLAVIA Buckley DO                     Reason for Visit:          Infant's Birth weight:  6-10.2 3010g Katharine+  Previous Weight:  6-3.3 2815g   Wt Loss:  6.5%    Today's Weight:     6-3.8 2828g   Wt Loss:  6.0%    Feeding History Since Discharge/Last Lactation Appt.:  Mom has been pumping approx 10 mins, giving EBM and formula per bottle with somewhat paced method last 24 hours. Pt concerned pumping too much would exacerbate engorged breast, thus did not pump for 20 mins at last few sessions. Infant too sleepy at breast, prompting bottle feeds. Parents using somewhat of a paced method for bottle, giving 25 mls formula per feeding session. Pt was using size 24mm flanges with discomfort yesterday. She is now using size 20 mm flanges on [employer]  comfortably. Pt collected only drops with each attempt at home.  Pt also using manual.     Past 24 Hours Voids/Stools:  6+ / 4      Color of Stool: brown    Time at Breast  Right  Breast: 5 mins attempting. Infant too sleepy for feeds even upon waking with Brooke reflex.  Left Breast:   no attempts made                        Total Minutes:     5        Total Weight Gain:   0     gms    Average Feeding Amount for Age: 30-45 mls every 2-3 hours or sooner if hunger cues are present.     Interventions: Pumped pt with [employer] grade pump for 30 mins. Used size 24 mm flanges, even though pt's best fit was 20 mm per measuring tool. Applied hot, wet cloths to bilateral breasts. Covered both breasts/cloths with dry baby blanket to retain heat to aid milk flow.  Repeatedly re-warmed cloths.  After 30 mins, 10 mls collected, very thick cream colored transitional milk. These 10 mls given to infant by Dad per paced side lying method. Dad gave another 35-40 mls formula per bottle. Ice packs applied to bilateral breasts inside bra at conclusion of consult.   Mastitis:   Pt unsure if has had fever as has been taking Tylenol for post partum discomfort. Urged pt to contact Ob/Gyn and report her lactation consult and her symptoms for direction.  Advised to eat yogurt or other preventative for thrush if antibiotics are prescribed.  Bfing attempted. Pre/post feed weights irrelevant as infant never suckled at breast. Bottles, nipples, ice, size 21mm flanges given.     Education: Mastitis tx, importance of pumping to empty breasts keeping them as soft as sides of cheeks.   Bfing: Noted we will begin working on latching and establishing effective bfing once mastitis under control and infant more alert.   Waking:  Taught Brooke reflex stim to waking as well as at-breast waking prompts.     Notified MD/ Orders Received:  Dr. Buckley notified of infant's sleepiness at breast, mastitis, and wt loss of 6%.    Feeding Plan:   Tonight, pump for 20 mins every 2 hours. Use warm wet cloths on breasts prior to pumping. Massage breast while pumping if using manual.   Use 20mm flanges or inserts for all pumps.  After pumping, ice breast 10 mins on/ 10 mins off.  Feed Osmin with PACED SIDE LYING POSITION for every feed with bottle, 45+ mls each feed.  Open formula. Pour into feeding bottle. Store remainder in fridge.   Tomorrow, pump every 3 hours and continue with Paced Bottle feedings.   Consult Ob/Gyn regarding mastitis infection.     Plan of   Interventions require further assessment with [facility] Lactation  Interventions require further assessment with MD    Future Appointments:  Lactation: 25, Wed. 11 am [facility] location. [place] at lab.  Physician: Dr. Buckley as  ordered  Signature: Eloisa Lockett IBCLC  Date:  4/1/25

## 2025-04-02 ENCOUNTER — HOSPITAL ENCOUNTER (OUTPATIENT)
Dept: LACTATION | Facility: HOSPITAL | Age: 30
Discharge: HOME | End: 2025-04-02
Payer: COMMERCIAL

## 2025-04-02 NOTE — PAYOR COMM NOTE
"RECONSIDERATION  REF:  [id number]   3 DAY STAY MATERNITY ADMISSION   ADMIT 3/28/2025 TO 3/31/2025    Jesus Trevino (30 y.o. Female)       Date of Birth   1995    Social Security Number       Address   [address] [facility] [address]    Home Phone   [phone]    MRN   [id number]       Church   [employer]    Marital Status                               Admission Date   3/28/2025    Admission Type   Elective    Admitting Provider   Mark Ron MD    Attending Provider       Department, Room/Bed   [facility] MOTHER BABY 2A, M235/1       Discharge Date   3/31/2025    Discharge Disposition   Home or Self Care    Discharge Destination                                 Attending Provider: (none)   Allergies: Raspberry    Isolation: None   Infection: None   Code Status: Prior    Ht: 175.3 cm (69\")   Wt: 91.6 kg (202 lb)    Admission Cmt: None   Principal Problem: Normal labor and delivery [O80]                   Active Insurance as of 3/28/2025       Primary Coverage       Payor Plan Insurance Group Employer/Plan Group    ANTHEM BLUE CROSS ANTHEM BLUE CROSS BLUE SHIELD PPO [id number]       Payor Plan Address Payor Plan Phone Number Payor Plan Fax Number Effective Dates    [address] [phone]  10/1/2024 - None Entered    [place] [address]         Subscriber Name Subscriber Birth Date Member ID       HALLE TREVINO 1995 [id number]                     Emergency Contacts        (Rel.) Home Phone Work Phone Mobile Phone    EDI TREVINO (Spouse) -- -- [phone]    Radha Burciaga (Mother) -- -- [phone]    Janeen Chiangght (Father) -- -- [phone]      EDC 2025    G-1 P-0      36/5 GESTATIONAL AGE       3/28/2025 AT  9:54 PM VAGINAL DELIVERY   FEMALE  3010 GRAMS IN WEIGHT  APGAR 6/9    3/31/2025 DISCHARGED      Terrie Vital, RN   Registered Nurse  Nursing     Nursing Note     Addendum     Date of Service: 25 1208  Creation " Time: 03/30/25 1613       Pt complaint of chest tightness like she had earlier this morning on the previous shift.  No SOB or other S/S of distress noted. Call placed to on call provider MERCEDES Tang at this time.  Pt reports being on Zoloft prior to pregnancy for anxiety and feels like this is an anxiety attack.  Pt placed on continuous pulse ox at this time          Terrie Vital, RN   Registered Nurse  Nursing     Nursing Note     Addendum     Date of Service: 03/30/25 1305  Creation Time: 03/30/25 1614       Pt complaint of chest tightness still.  No SOB or other S/S of distress noted. .  Call placed to MERCEDES Tang again at this time.  Order received for EKG at this time.        Terrie mariscal, RN   Registered Nurse  Nursing     Plan of Care     Signed     Date of Service: 03/30/25 1740  Creation Time: 03/30/25 1740     Signed         Goal Outcome Evaluation:  Outcome Evaluation: Pt had chest tightness and bradycardia this shift-chest x-ray and EKG ordered.  No SOB or other S/S of distress noted-pt reports this has resolved.  Pt reports she believes it is from anxiety.  Fundus and lochia WDL.  Pt is attempting to breastfeeding, pumping and utilizing donor breast milk.  Zoloft restarted this shift and Atarax orders changed.  Bonding well with infant.                 Marisol Vail, RN   Registered Nurse  Nursing     Plan of Care     Signed     Date of Service: 03/31/25 0457  Creation Time: 03/31/25 0457     Signed         Goal Outcome Evaluation:  Plan of Care Reviewed With: patient, spouse  Progress: improving  Outcome Evaluation: VSS. FFMLUU, scant bleeding. atempting to breastfeed, now supplementing with formula. bonding well with infant. anxiety better per patient. intermintent mild chest tightness reported by pt.                     History & Physical          Ariadne, Mark HURLEY MD at 03/28/25 0738          H&P updated. The patient was examined and admitted in early labor.    Electronically  signed by Mark Ron MD at 25 0739   Source Note: H&P (View-Only)           Jayy Trevino  : 1995  MRN: [id number]  CSN: [id number]    Labor & Delivery PIO progress note    Subjective  Chief Complaint: She reports possible rupture of membranes. Fern and nitrazine positive. Unknown GBS status, was swabbed in office yesterday.    HPI:     History:    Prenatal Information:  Prenatal Results       Initial Prenatal Labs       Test Value Reference Range Date Time    Hemoglobin  11.5 g/dL 11.1 - 15.9 09/10/24 1331       13.0 g/dL 12.0 - 15.9 24 1431    Hematocrit  35.8 % 34.0 - 46.6 09/10/24 1331       38.2 % 34.0 - 46.6 24 1431    Platelets  250 x10E3/uL 150 - 450 09/10/24 1331       249 10*3/mm3 140 - 450 24 1431    Rubella IgG  1.05 index Immune >0.99 09/10/24 1331    Hepatitis B SAg  Negative  Negative 09/10/24 1331    Hepatitis C Ab  Non Reactive  Non Reactive 09/10/24 1331    RPR  Reactive  Non Reactive 25 0815       Non Reactive  Non Reactive 09/10/24 1331    T. Pallidum Ab   Non Reactive  Non Reactive 25 1318    ABO  A   25 0230    Rh  Negative   25 0230    Antibody Screen  Negative  Negative 09/10/24 1331       Negative   24 1239    HIV  Non Reactive  Non Reactive 09/10/24 1331    Urine Culture  Final report   09/10/24 1331    Gonorrhea  Negative  Negative 09/10/24 1331    Chlamydia  Negative  Negative 09/10/24 1331    TSH        HgB A1c         Varicella IgG        Hemoglobinopathy Fractionation        Hemoglobinopathy (genetic testing)        Cystic fibrosis         Spinal muscular atrophy        Fragile X                  Fetal testing        Test Value Reference Range Date Time    NIPT        MSAFP        AFP-4                  2nd and 3rd Trimester       Test Value Reference Range Date Time    Hemoglobin (repeated)  11.6 g/dL 12.0 - 15.9 25 0230       11.1 g/dL 11.1 - 15.9 25 0815    Hematocrit (repeated)  34.4  % 34.0 - 46.6 03/28/25 0230    Platelets   208 10*3/mm3 140 - 450 03/28/25 0230       250 x10E3/uL 150 - 450 09/10/24 1331       249 10*3/mm3 140 - 450 08/14/24 1431    1 hour GTT   108 mg/dL 70 - 139 01/30/25 0815    Antibody Screen (repeated)  Negative  Negative 01/30/25 0815    3rd TM syphilis scrn (repeated)  RPR   Reactive  Non Reactive 01/30/25 0815    3rd TM syphilis scrn (repeated) TP-Ab  Non Reactive  Non Reactive 02/05/25 1318    3rd TM syphilis screen TB-Ab (FTA)  Non Reactive  Non Reactive 02/05/25 1318    Syphilis cascade test TP-Ab (EIA)        Syphilis cascade TPPA        GTT Fasting        GTT 1 Hr        GTT 2 Hr        GTT 3 Hr        Group B Strep                  Other testing        Test Value Reference Range Date Time    Parvo IgG         CMV IgG                   Drug Screening       Test Value Reference Range Date Time    Amphetamine Screen        Barbiturate Screen        Benzodiazepine Screen        Methadone Screen        Phencyclidine Screen        Opiates Screen        THC Screen        Cocaine Screen        Propoxyphene Screen        Buprenorphine Screen        Methamphetamine Screen        Oxycodone Screen        Tricyclic Antidepressants Screen                  Legend    ^: Historical                          External Prenatal Results       Pregnancy Outside Results - Transcribed From Office Records - See Scanned Records For Details       Test Value Date Time    ABO  A  03/28/25 0230    Rh  Negative  03/28/25 0230    Antibody Screen  Negative  01/30/25 0815       Negative  09/10/24 1331       Negative  08/16/24 1239    Varicella IgG       Rubella  1.05 index 09/10/24 1331    Hgb  11.6 g/dL 03/28/25 0230       11.1 g/dL 01/30/25 0815       11.5 g/dL 09/10/24 1331       13.0 g/dL 08/14/24 1431    Hct  34.4 % 03/28/25 0230       35.8 % 09/10/24 1331       38.2 % 08/14/24 1431    HgB A1c        1h GTT  108 mg/dL 01/30/25 0815    3h GTT Fasting       3h GTT 1 hour       3h GTT 2 hour        3h GTT 3 hour        Gonorrhea (discrete)  Negative  09/10/24 1331    Chlamydia (discrete)  Negative  09/10/24 1331    RPR  Reactive  25 0815       Non Reactive  09/10/24 1331    Syphils cascade: TP-Ab (FTA)  Non Reactive  25 1318    TP-Ab  Non Reactive  25 1318    TP-Ab (EIA)       TPPA       HBsAg  Negative  09/10/24 1331    Herpes Simplex Virus PCR       Herpes Simplex VIrus Culture       HIV  Non Reactive  09/10/24 1331    Hep C RNA Quant PCR       Hep C Antibody  Non Reactive  09/10/24 1331    AFP       NIPT       Cystic Fibrosis (David)       Cystic Fibroisis        Spinal Muscular atrophy       Fragile X       Group B Strep       GBS Susceptibility to Clindamycin       GBS Susceptibility to Erythromycin       Fetal Fibronectin       Genetic Testing, Maternal Blood                 Drug Screening       Test Value Date Time    Urine Drug Screen       Amphetamine Screen       Barbiturate Screen       Benzodiazepine Screen       Methadone Screen       Phencyclidine Screen       Opiates Screen       THC Screen       Cocaine Screen       Propoxyphene Screen       Buprenorphine Screen       Methamphetamine Screen       Oxycodone Screen       Tricyclic Antidepressants Screen                 Legend    ^: Historical                             Past OB History:     OB History    Para Term  AB Living   1 0 0 0 0 0   SAB IAB Ectopic Molar Multiple Live Births   0 0 0 0 0 0      # Outcome Date GA Lbr Leonel/2nd Weight Sex Type Anes PTL Lv   1 Current                Past Medical History: Past Medical History:   Diagnosis Date    Anxiety and depression     Eating disorder     Anorexia, bulemia, Weight loss pills and laxatives.     GERD (gastroesophageal reflux disease)       Past Surgical History Past Surgical History:   Procedure Laterality Date    ENDOSCOPY      2011    WISDOM TOOTH EXTRACTION        Family History: Family History   Problem Relation Age of Onset    Depression Mother      Depression Father     No Known Problems Sister     Cancer Maternal Grandmother         anal cancer    Cancer Maternal Grandfather         Bone cancer? Unknown primary    Diabetes Maternal Grandfather     Cancer Paternal Grandmother         lung    Cancer Paternal Grandfather         skin cancer    Diabetes Paternal Grandfather       Social History:  reports that she has never smoked. She has never used smokeless tobacco.   reports current alcohol use.   reports no history of drug use.           High Risk Medical Conditions/Complaints this visit: reactive RPR with neg TPA    Discussion of Management or Test Interpretation with physician/provider/healthcare provider: yes    External Records Reviewed: [facility] [place] [facility] [place] JACI Ron reviewed, pregnancy complicated by: reactive RPR with neg TPA    Review Previous Test Results: Prenatal labs in [facility] reviewed, history of: anemia    Independent Historian: None    Social Determinants to Health: No drug, transportation or housing or other issues noted       Objective  Medical Decision Making:  Amount/Complexity of Data Reviewed  -Labs ordered - admission  -Imaging ordered  -IV fluids given - yes  Risk:  Prescription drug management  Drug therapy requiring intensive monitoring for toxicity  Decision to admit patient - yes  Diagnosis/Treatment limited by social determinants    Min/max vitals past 24 hours:  Temp  Min: 97.6 °F (36.4 °C)  Max: 97.6 °F (36.4 °C)   BP  Min: 126/88  Max: 152/82   Pulse  Min: 80  Max: 80   Resp  Min: 18  Max: 18        Independent Interpretation NST/FHT's: reassuring and category 1.  external monitors used   Cervix: was checked (by RN): 1 cm / 50 % / -3   Contractions:    Review of current test: results irregular    +fern, nitrazine       Final Diagnoses: Rupture of membranes before labor       Assessment  IUP at 36w5d  Rupture of membranes  Unknown GBS     Plan  Admit  GBS prophylaxis    Maribel Culp,  MD  3/28/2025  03:46 CDT       Electronically signed by Maribel Culp MD at 25 0350                 Maribel Culp MD at 25 0345           Jayy Trevino  : 1995  MRN: [id number]  CSN: [id number]    Labor & Delivery PIO progress note    Subjective  Chief Complaint: She reports possible rupture of membranes. Fern and nitrazine positive. Unknown GBS status, was swabbed in office yesterday.    HPI:     History:    Prenatal Information:  Prenatal Results       Initial Prenatal Labs       Test Value Reference Range Date Time    Hemoglobin  11.5 g/dL 11.1 - 15.9 09/10/24 1331       13.0 g/dL 12.0 - 15.9 24 1431    Hematocrit  35.8 % 34.0 - 46.6 09/10/24 1331       38.2 % 34.0 - 46.6 24 1431    Platelets  250 x10E3/uL 150 - 450 09/10/24 1331       249 10*3/mm3 140 - 450 24 1431    Rubella IgG  1.05 index Immune >0.99 09/10/24 1331    Hepatitis B SAg  Negative  Negative 09/10/24 1331    Hepatitis C Ab  Non Reactive  Non Reactive 09/10/24 1331    RPR  Reactive  Non Reactive 25 0815       Non Reactive  Non Reactive 09/10/24 1331    T. Pallidum Ab   Non Reactive  Non Reactive 25 1318    ABO  A   25 0230    Rh  Negative   25 0230    Antibody Screen  Negative  Negative 09/10/24 1331       Negative   24 1239    HIV  Non Reactive  Non Reactive 09/10/24 1331    Urine Culture  Final report   09/10/24 1331    Gonorrhea  Negative  Negative 09/10/24 1331    Chlamydia  Negative  Negative 09/10/24 1331    TSH        HgB A1c         Varicella IgG        Hemoglobinopathy Fractionation        Hemoglobinopathy (genetic testing)        Cystic fibrosis         Spinal muscular atrophy        Fragile X                  Fetal testing        Test Value Reference Range Date Time    NIPT        MSAFP        AFP-4                  2nd and 3rd Trimester       Test Value Reference Range Date Time    Hemoglobin (repeated)  11.6 g/dL 12.0 - 15.9  03/28/25 0230       11.1 g/dL 11.1 - 15.9 01/30/25 0815    Hematocrit (repeated)  34.4 % 34.0 - 46.6 03/28/25 0230    Platelets   208 10*3/mm3 140 - 450 03/28/25 0230       250 x10E3/uL 150 - 450 09/10/24 1331       249 10*3/mm3 140 - 450 08/14/24 1431    1 hour GTT   108 mg/dL 70 - 139 01/30/25 0815    Antibody Screen (repeated)  Negative  Negative 01/30/25 0815    3rd TM syphilis scrn (repeated)  RPR   Reactive  Non Reactive 01/30/25 0815    3rd TM syphilis scrn (repeated) TP-Ab  Non Reactive  Non Reactive 02/05/25 1318    3rd TM syphilis screen TB-Ab (FTA)  Non Reactive  Non Reactive 02/05/25 1318    Syphilis cascade test TP-Ab (EIA)        Syphilis cascade TPPA        GTT Fasting        GTT 1 Hr        GTT 2 Hr        GTT 3 Hr        Group B Strep                  Other testing        Test Value Reference Range Date Time    Parvo IgG         CMV IgG                   Drug Screening       Test Value Reference Range Date Time    Amphetamine Screen        Barbiturate Screen        Benzodiazepine Screen        Methadone Screen        Phencyclidine Screen        Opiates Screen        THC Screen        Cocaine Screen        Propoxyphene Screen        Buprenorphine Screen        Methamphetamine Screen        Oxycodone Screen        Tricyclic Antidepressants Screen                  Legend    ^: Historical                          External Prenatal Results       Pregnancy Outside Results - Transcribed From Office Records - See Scanned Records For Details       Test Value Date Time    ABO  A  03/28/25 0230    Rh  Negative  03/28/25 0230    Antibody Screen  Negative  01/30/25 0815       Negative  09/10/24 1331       Negative  08/16/24 1239    Varicella IgG       Rubella  1.05 index 09/10/24 1331    Hgb  11.6 g/dL 03/28/25 0230       11.1 g/dL 01/30/25 0815       11.5 g/dL 09/10/24 1331       13.0 g/dL 08/14/24 1431    Hct  34.4 % 03/28/25 0230       35.8 % 09/10/24 1331       38.2 % 08/14/24 1431    HgB A1c        1h GTT   108 mg/dL 25 0815    3h GTT Fasting       3h GTT 1 hour       3h GTT 2 hour       3h GTT 3 hour        Gonorrhea (discrete)  Negative  09/10/24 1331    Chlamydia (discrete)  Negative  09/10/24 1331    RPR  Reactive  25 0815       Non Reactive  09/10/24 1331    Syphils cascade: TP-Ab (FTA)  Non Reactive  25 1318    TP-Ab  Non Reactive  25 1318    TP-Ab (EIA)       TPPA       HBsAg  Negative  09/10/24 1331    Herpes Simplex Virus PCR       Herpes Simplex VIrus Culture       HIV  Non Reactive  09/10/24 1331    Hep C RNA Quant PCR       Hep C Antibody  Non Reactive  09/10/24 1331    AFP       NIPT       Cystic Fibrosis (David)       Cystic Fibroisis        Spinal Muscular atrophy       Fragile X       Group B Strep       GBS Susceptibility to Clindamycin       GBS Susceptibility to Erythromycin       Fetal Fibronectin       Genetic Testing, Maternal Blood                 Drug Screening       Test Value Date Time    Urine Drug Screen       Amphetamine Screen       Barbiturate Screen       Benzodiazepine Screen       Methadone Screen       Phencyclidine Screen       Opiates Screen       THC Screen       Cocaine Screen       Propoxyphene Screen       Buprenorphine Screen       Methamphetamine Screen       Oxycodone Screen       Tricyclic Antidepressants Screen                 Legend    ^: Historical                             Past OB History:     OB History    Para Term  AB Living   1 0 0 0 0 0   SAB IAB Ectopic Molar Multiple Live Births   0 0 0 0 0 0      # Outcome Date GA Lbr Leonel/2nd Weight Sex Type Anes PTL Lv   1 Current                Past Medical History: Past Medical History:   Diagnosis Date    Anxiety and depression     Eating disorder     Anorexia, bulemia, Weight loss pills and laxatives.     GERD (gastroesophageal reflux disease)       Past Surgical History Past Surgical History:   Procedure Laterality Date    ENDOSCOPY          WISDOM TOOTH EXTRACTION        Family  History: Family History   Problem Relation Age of Onset    Depression Mother     Depression Father     No Known Problems Sister     Cancer Maternal Grandmother         anal cancer    Cancer Maternal Grandfather         Bone cancer? Unknown primary    Diabetes Maternal Grandfather     Cancer Paternal Grandmother         lung    Cancer Paternal Grandfather         skin cancer    Diabetes Paternal Grandfather       Social History:  reports that she has never smoked. She has never used smokeless tobacco.   reports current alcohol use.   reports no history of drug use.           High Risk Medical Conditions/Complaints this visit: reactive RPR with neg TPA    Discussion of Management or Test Interpretation with physician/provider/healthcare provider: yes    External Records Reviewed: [place] [facility] [place] [facility] [place] JACI Ron reviewed, pregnancy complicated by: reactive RPR with neg TPA    Review Previous Test Results: Prenatal labs in [facility] reviewed, history of: anemia    Independent Historian: None    Social Determinants to Health: No drug, transportation or housing or other issues noted       Objective  Medical Decision Making:  Amount/Complexity of Data Reviewed  -Labs ordered - admission  -Imaging ordered  -IV fluids given - yes  Risk:  Prescription drug management  Drug therapy requiring intensive monitoring for toxicity  Decision to admit patient - yes  Diagnosis/Treatment limited by social determinants    Min/max vitals past 24 hours:  Temp  Min: 97.6 °F (36.4 °C)  Max: 97.6 °F (36.4 °C)   BP  Min: 126/88  Max: 152/82   Pulse  Min: 80  Max: 80   Resp  Min: 18  Max: 18        Independent Interpretation NST/FHT's: reassuring and category 1.  external monitors used   Cervix: was checked (by RN): 1 cm / 50 % / -3   Contractions:    Review of current test: results irregular    +fern, nitrazine       Final Diagnoses: Rupture of membranes before labor       Assessment  IUP at 36w5d  Rupture of  "membranes  Unknown GBS     Plan  Admit  GBS prophylaxis    Maribel Culp MD  3/28/2025  03:46 CDT       Electronically signed by Maribel Culp MD at 03/28/25 0350          Physician Progress Notes (last 4 days)        Mark Ron MD at 03/30/25 1103                Mark Ron MD  [facility]  [address] [address] [facility] [address] [place] [address]  Office [phone]  Fax [phone]    [facility]  Vaginal Delivery Progress Note    Subjective   Postpartum Day 2: Vaginal Delivery    The patient feels well.  Her pain is well controlled with nonsteroidal anti-inflammatory drugs.   She is ambulating well.  Patient describes her bleeding as thin lochia.    Breastfeeding: infant latching without difficulty.    Objective     Vital Signs Range for the last 24 hours  Temperature: Temp:  [97.5 °F (36.4 °C)-99 °F (37.2 °C)] 97.8 °F (36.6 °C)   Temp Source: Temp src: Temporal   BP: BP: (118-136)/(55-74) 136/71   Pulse: Heart Rate:  [57-90] 64   Respirations: Resp:  [16-18] 16   SPO2: SpO2:  [97 %-98 %] 97 %   O2 Amount (l/min):     O2 Devices Device (Oxygen Therapy): room air   Weight:       Admit Height:  Height: 175.3 cm (69\")      Physical Exam:  General:  no acute distresss.  Abdomen: Fundus: appropriate, firm, non tender  Extremities: normal, atraumatic, no cyanosis, and trace edema.       Lab results reviewed:  Yes   Rubella:  No results found for: \"RUBELLAIGGIN\" Nurse Transcribed from prenatal record --  No components found for: \"EXTRUBELQUAL\"  Rh Status:    RH type   Date Value Ref Range Status   03/29/2025 Negative  Final     Immunizations:   Immunization History   Administered Date(s) Administered    COVID-19 (MODERNA) 1st,2nd,3rd Dose Monovalent 02/06/2021, 03/06/2021    DTaP, Unspecified 06/09/1998, 05/23/2006    Fluzone (or Fluarix & Flulaval for VFC) >6mos 10/02/2019    Fluzone Quad >6mos (Multi-dose) 03/03/2017    MMR 06/09/1998    Meningococcal Polysaccharide 05/23/2006    OPV " 06/09/1998    Rho (D) Immune Globulin 01/30/2025, 03/29/2025    Tdap 10/02/2019, 02/13/2025    Varicella 06/09/1998     Lab Results (last 24 hours)       ** No results found for the last 24 hours. **            External Prenatal Results       Pregnancy Outside Results - Transcribed From Office Records - See Scanned Records For Details       Test Value Date Time    ABO  A  03/29/25 0939    Rh  Negative  03/29/25 0939    Antibody Screen  Positive  03/29/25 0939       Positive  03/28/25 0230       Negative  01/30/25 0815       Negative  09/10/24 1331       Negative  08/16/24 1239    Varicella IgG       Rubella  1.05 index 09/10/24 1331    Hgb  9.9 g/dL 03/29/25 0939       11.6 g/dL 03/28/25 0230       11.1 g/dL 01/30/25 0815       11.5 g/dL 09/10/24 1331       13.0 g/dL 08/14/24 1431    Hct  29.5 % 03/29/25 0939       34.4 % 03/28/25 0230       35.8 % 09/10/24 1331       38.2 % 08/14/24 1431    HgB A1c        1h GTT  108 mg/dL 01/30/25 0815    3h GTT Fasting       3h GTT 1 hour       3h GTT 2 hour       3h GTT 3 hour        Gonorrhea (discrete)  Negative  09/10/24 1331    Chlamydia (discrete)  Negative  09/10/24 1331    RPR  Reactive  01/30/25 0815       Non Reactive  09/10/24 1331    Syphils cascade: TP-Ab (FTA)  Non-Reactive  03/28/25 0230       Non Reactive  02/05/25 1318    TP-Ab  Non-Reactive  03/28/25 0230       Non Reactive  02/05/25 1318    TP-Ab (EIA)       TPPA       HBsAg  Negative  09/10/24 1331    Herpes Simplex Virus PCR       Herpes Simplex VIrus Culture       HIV  Non Reactive  09/10/24 1331    Hep C RNA Quant PCR       Hep C Antibody  Non Reactive  09/10/24 1331    AFP       NIPT       Cystic Fibrosis (David)       Cystic Fibroisis        Spinal Muscular atrophy       Fragile X       Group B Strep       GBS Susceptibility to Clindamycin       GBS Susceptibility to Erythromycin       Fetal Fibronectin       Genetic Testing, Maternal Blood                 Drug Screening       Test Value Date Time     Urine Drug Screen       Amphetamine Screen       Barbiturate Screen       Benzodiazepine Screen       Methadone Screen       Phencyclidine Screen       Opiates Screen       THC Screen       Cocaine Screen       Propoxyphene Screen       Buprenorphine Screen       Methamphetamine Screen       Oxycodone Screen       Tricyclic Antidepressants Screen                 Legend    ^: Historical                            Assessment & Plan       Normal labor and delivery      Jesus Trevino is Day 2  post-partum  Vaginal, Spontaneous  .      Plan:  Discharge home with standard precautions and return to clinic in 4-6 weeks.      Mark Ron MD  3/30/2025  11:03 CDT       Electronically signed by Mark Ron MD at 25 1103     Sanchez San MD   Physician  OB/GYN     Discharge Summary     Addendum     Date of Service: 25  Creation Time: 25     Expand All Collapse All    [facility]     Sanchez San MD  [address] [facility] [address] [place] [address]  [phone]        Discharge Summary        Jesus Trevino  :   1995  MRN:   [id number]  CSN:    [id number]     Date of Admission: 3/28/2025   Date of Discharge:  3/31/2025   Delivering Physician: Mark Ron         Admission Diagnosis: Pregnancy [Z34.90]  36 weeks  labor      Discharge Diagnosis: Pregnancy at 36w5d - delivered  S/p          Procedures: 3/28/2025 - Vaginal, Spontaneous       Presenting Problem/History of Present Illness        Active Hospital Problems     Diagnosis   POA    **Normal labor and delivery [O80]   Not Applicable    36 weeks gestation of pregnancy [Z3A.36]   Not Applicable       Resolved Hospital Problems     Diagnosis Date Resolved POA    Pregnancy [Z34.90] 2025 Not Applicable                    Hospital Course  Patient is a 30 y.o.  who at 36w5d had a vaginal birth.  Her postpartum course was without complications.  On PPD #3 she was ready for  discharge. Denies chest pain/shortness of breath. Anxiety improved.  She had normal lochia and pain well controlled with oral medications. The patient feels well.   She is ambulating well.  Patient describes her bleeding as thin lochia.     Breastfeeding: infant latching.     Infant  female fetus weighing 3010 g (6 lb 10.2 oz)  Apgars -  6 @ 1 minute /  9 @ 5 minutes.     Procedures Performed        Consults:   Consults         No orders found from 2025 to 3/29/2025.                Pertinent Test Results:   CBC Results   CBC            2025    08:15 3/28/2025    02:30 3/29/2025    09:39   CBC   WBC   12.43      RBC   3.87      Hemoglobin 11.1  11.6  9.9    Hematocrit   34.4  29.5    MCV   88.9      MCH   30.0      MCHC   33.7      RDW   13.9      Platelets   208               Condition on Discharge:  Stable     Temp:  [98.2 °F (36.8 °C)-98.9 °F (37.2 °C)] 98.2 °F (36.8 °C)  Heart Rate:  [51-82] 51  Resp:  [18-20] 18  BP: (117-139)/(62-81) 136/75     Physical Exam:     General Alert, No acute distress, non-toxic appearing    Lungs Non-labored, symmetrical chest rise   Abdomen abdomen is soft without significant tenderness, masses, organomegaly or guarding. Fundus: appropriate, firm, non tender   Extremities  extremities normal, atraumatic, no cyanosis or edema         Result Review  CBC Results   CBC            2025    08:15 3/28/2025    02:30 3/29/2025    09:39   CBC   WBC   12.43      RBC   3.87      Hemoglobin 11.1  11.6  9.9    Hematocrit   34.4  29.5    MCV   88.9      MCH   30.0      MCHC   33.7      RDW   13.9      Platelets   208                       Postpartum Depression: Medium Risk (3/30/2025)     [place]  Depression Scale      Last EPDS Total Score: 7      Last EPDS Self Harm Result: Never      XR Chest PA & Lateral  Result Date: 3/30/2025  Shallow inspiration, no acute cardiopulmonary abnormality.   This report was signed and finalized on 3/30/2025 2:37 PM by Dr. Ivey  MD Delfina.         03/30/25 13:17   ECG 12 Lead Rpt   Rpt: View report in Results Review for more information  Sinus bradycardia with sinus arrhythmia  Otherwise normal ECG  No previous ECGs available             Discharge Disposition  Home or Self Care     Discharge Medications      Discharge Medications          New Medications         Instructions Start Date   acetaminophen 325 MG tablet  Commonly known as: TYLENOL    650 mg, Oral, Every 6 Hours PRN        docusate sodium 100 MG capsule    100 mg, Oral, 2 Times Daily PRN        ibuprofen 600 MG tablet  Commonly known as: ADVIL,MOTRIN    600 mg, Oral, Every 6 Hours PRN                  Continue These Medications         Instructions Start Date   prenatal (CLASSIC) vitamin 28-0.8 MG tablet tablet  Generic drug: prenatal vitamin    Daily                  Stop These Medications       CHOLINE PO      DHEA PO      Magnesium 125 MG capsule      omeprazole 20 MG capsule  Commonly known as: priLOSEC                   Discharge Diet: home diet     Activity at Discharge: pelvic rest     Follow-up Appointments         Future Appointments   Date Time Provider Department Center   4/1/2025  2:00 PM [facility]   5/15/2025 11:15 AM Mark Ron MD [facility]  [facility]         Follow-up for postpartum visit in 4-6 weeks     Test Results Pending at Discharge  Pending Results         None                Sanchez San MD  03/31/25  09:41 CDT     Time: Discharge <30 min                      Revision History    Routing History

## 2025-04-02 NOTE — LACTATION NOTE
Mother's Name:          Jesus Trevino  G/P:                              1/1  Significant Medical History:  anxiety, depression, anorexia, bulemia, GERD  Maternal Breast Assessment:  bilateral very faint pink clouding on breasts, mostly soft with slightly firm areas upper quads bilaterally, no nipple trauma; white milk drops easily expressed   Support Person;  , Cordell  Return to Work  , inpatient/outpatient hospital [facility] Medical: PRN, max 2 days/wk after maternity leave      Supportive employer     Infant's Name:                       Osmin Trevino  Date of Birth:                           [birth year]  Gestational age at Birth:         36-5  Age:                                         5 days  Physician:                                FLAVIA Buckley DO                 Reason for Visit:  Evaluation of mastitis, infant weight check, assistance with at-breast feeding, transfer at breast eval                     Infant's Birth weight:                6-10.2   3010g  Katharine+  Previous Weight:                     6-3.3  2815g                          Wt Loss:  6.5%  Last Weight;   6-3.8   2828g   Wt Loss:  6.0%    Today's Weight:        6-2.8  2802g   Wt Loss:  6.9%     Feeding History Since Discharge/Last Lactation Appt.:  Mom has been pumping every 2-3 hours with Spectra size 20mm flange. She spends 20 mins pumping. Max collected was 35 mls. Infant was given 30-35 mls per feed. Infant remains still quite sleepy even with bottle feeds. Parents using paced method.     Past 24 Hours Voids/Stools:    6 /6  in 21 hours. Color of Stool: seedy/brown    Time at Breast  -- Attempted x 10 mins. Woke infant w [place] reflex stim, but no feeding, very sleepy.  Right  Breast:    Left Breast:                                Total Minutes:       0      Total Weight Gain:      0  gms     Average Feeding Amount for Age: 45-60 mls every 2-3 hours or sooner if hunger cues are present; or 8-12 times in 24 hours.    "  Interventions:  Bfing attempted x 10 mins. Infant asleep immediately after waking with [place] reflex stim. Briefly gave maximum assistance for cross cradle feed; positioning, prompting gape, compressing breast. Infant gaped 1-2 timer s 1-2 sucks only.  Mastitis:   breasts coloring / density greatly improved from 21 hours ago at last OP lactation consult. Pt conts to feel \"cold\" and tired feeling, body aches remain, but less so than yesterday.  Pt consulted Ob/Gyn yesterday. APRN for Dr. Engle prescribed antibiotic, but pt has not yet begun to take. All improvements are from pt resting, intaking fluids, and pumping.      Education:      Notified MD/ Orders Received:       Feeding Plan:   Pump every 3 hours.   Cordell / you pace bottle feed, 50 mls minimum.   Take tylenol as needed.   Continue resting, pumping, taking fluids.   Holding skin to skin as often as possible.        Plan of   Interventions require further assessment with [facility] Lactation  Interventions require further assessment with MD     Future Appointments:  Lactation:        25,  1pm, [facility] [place] at MAIN ADMITTING:  Cordell goes into ER to notify  that you are there for Lactation. They will send out a staff to register you in the lobby. SHAGGY AND JACQUELINE DO NOT GO INTO ER. After registering go to Mom/Baby floor.  Physician:        Dr. Buckley as ordered  Signature:        Eloisa Lockett IBCLC  Date:                25  "

## 2025-04-03 ENCOUNTER — APPOINTMENT (OUTPATIENT)
Dept: CT IMAGING | Age: 30
End: 2025-04-03
Payer: COMMERCIAL

## 2025-04-03 ENCOUNTER — APPOINTMENT (OUTPATIENT)
Dept: GENERAL RADIOLOGY | Age: 30
End: 2025-04-03
Payer: COMMERCIAL

## 2025-04-03 ENCOUNTER — APPOINTMENT (OUTPATIENT)
Dept: VASCULAR LAB | Age: 30
End: 2025-04-03
Attending: EMERGENCY MEDICINE
Payer: COMMERCIAL

## 2025-04-03 ENCOUNTER — HOSPITAL ENCOUNTER (OUTPATIENT)
Age: 30
Setting detail: OBSERVATION
Discharge: HOME | End: 2025-04-04
Attending: EMERGENCY MEDICINE | Admitting: OBSTETRICS & GYNECOLOGY
Payer: COMMERCIAL

## 2025-04-03 ENCOUNTER — PATIENT MESSAGE (OUTPATIENT)
Age: 30
End: 2025-04-03

## 2025-04-03 LAB
ALBUMIN SERPL-MCNC: 3.6 G/DL (ref 3.5–5.2)
ALP SERPL-CCNC: 118 U/L (ref 35–104)
ALT SERPL-CCNC: 93 U/L (ref 10–35)
ANION GAP SERPL CALCULATED.3IONS-SCNC: 12 MMOL/L (ref 8–16)
AST SERPL-CCNC: 61 U/L (ref 10–35)
BACTERIA #/AREA URNS HPF: ABNORMAL /HPF
BASOPHILS # BLD: 0.1 K/UL (ref 0–0.2)
BASOPHILS NFR BLD: 0.7 % (ref 0–1)
BILIRUB SERPL-MCNC: <0.2 MG/DL (ref 0.2–1.2)
BILIRUB UR QL STRIP: NEGATIVE
BNP BLD-MCNC: 505 PG/ML (ref 0–124)
BUN SERPL-MCNC: 12 MG/DL (ref 6–20)
CALCIUM SERPL-MCNC: 10.2 MG/DL (ref 8.6–10)
CHLORIDE SERPL-SCNC: 105 MMOL/L (ref 98–107)
CLARITY UR: ABNORMAL
CO2 SERPL-SCNC: 21 MMOL/L (ref 22–29)
COLOR UR: ABNORMAL
CREAT SERPL-MCNC: 0.6 MG/DL (ref 0.5–0.9)
ECHO BSA: 1.94 M2
EOSINOPHIL # BLD: 0.4 K/UL (ref 0–0.6)
EOSINOPHIL NFR BLD: 4.1 % (ref 0–5)
ERYTHROCYTE [DISTWIDTH] IN BLOOD BY AUTOMATED COUNT: 13 % (ref 11.5–14.5)
GLUCOSE SERPL-MCNC: 112 MG/DL (ref 70–99)
GLUCOSE UR STRIP.AUTO-MCNC: NEGATIVE MG/DL
HCT VFR BLD AUTO: 33 % (ref 37–47)
HGB BLD-MCNC: 10.9 G/DL (ref 12–16)
HGB UR STRIP.AUTO-MCNC: ABNORMAL MG/L
IMM GRANULOCYTES # BLD: 0.1 K/UL
KETONES UR STRIP.AUTO-MCNC: NEGATIVE MG/DL
LEUKOCYTE ESTERASE UR QL STRIP.AUTO: ABNORMAL
LYMPHOCYTES # BLD: 2.4 K/UL (ref 1.1–4.5)
LYMPHOCYTES NFR BLD: 22.9 % (ref 20–40)
MAGNESIUM SERPL-MCNC: 2.2 MG/DL (ref 1.6–2.6)
MCH RBC QN AUTO: 30.4 PG (ref 27–31)
MCHC RBC AUTO-ENTMCNC: 33 G/DL (ref 33–37)
MCV RBC AUTO: 91.9 FL (ref 81–99)
MONOCYTES # BLD: 0.7 K/UL (ref 0–0.9)
MONOCYTES NFR BLD: 6.7 % (ref 0–10)
NEUTROPHILS # BLD: 6.6 K/UL (ref 1.5–7.5)
NEUTS SEG NFR BLD: 64.5 % (ref 50–65)
NITRITE UR QL STRIP.AUTO: NEGATIVE
PH UR STRIP.AUTO: 6.5 [PH] (ref 5–8)
PLATELET # BLD AUTO: 255 K/UL (ref 130–400)
PMV BLD AUTO: 9.9 FL (ref 9.4–12.3)
POTASSIUM SERPL-SCNC: 3.8 MMOL/L (ref 3.5–5.1)
PROT SERPL-MCNC: 6.1 G/DL (ref 6.4–8.3)
PROT UR STRIP.AUTO-MCNC: 30 MG/DL
RBC # BLD AUTO: 3.59 M/UL (ref 4.2–5.4)
RBC #/AREA URNS HPF: ABNORMAL /HPF (ref 0–2)
SODIUM SERPL-SCNC: 138 MMOL/L (ref 136–145)
SP GR UR STRIP.AUTO: 1 (ref 1–1.03)
SQUAMOUS #/AREA URNS HPF: ABNORMAL /HPF
UROBILINOGEN UR STRIP.AUTO-MCNC: 0.2 E.U./DL
WBC # BLD AUTO: 10.3 K/UL (ref 4.8–10.8)
WBC #/AREA URNS HPF: ABNORMAL /HPF (ref 0–5)

## 2025-04-03 PROCEDURE — 6360000004 HC RX CONTRAST MEDICATION: Performed by: EMERGENCY MEDICINE

## 2025-04-03 PROCEDURE — 2500000003 HC RX 250 WO HCPCS: Performed by: OBSTETRICS & GYNECOLOGY

## 2025-04-03 PROCEDURE — 85025 COMPLETE CBC W/AUTO DIFF WBC: CPT

## 2025-04-03 PROCEDURE — 81001 URINALYSIS AUTO W/SCOPE: CPT

## 2025-04-03 PROCEDURE — 96375 TX/PRO/DX INJ NEW DRUG ADDON: CPT

## 2025-04-03 PROCEDURE — 83880 ASSAY OF NATRIURETIC PEPTIDE: CPT

## 2025-04-03 PROCEDURE — 6360000002 HC RX W HCPCS: Performed by: EMERGENCY MEDICINE

## 2025-04-03 PROCEDURE — 99285 EMERGENCY DEPT VISIT HI MDM: CPT

## 2025-04-03 PROCEDURE — 96366 THER/PROPH/DIAG IV INF ADDON: CPT

## 2025-04-03 PROCEDURE — 93970 EXTREMITY STUDY: CPT | Performed by: SURGERY

## 2025-04-03 PROCEDURE — 71045 X-RAY EXAM CHEST 1 VIEW: CPT

## 2025-04-03 PROCEDURE — 80053 COMPREHEN METABOLIC PANEL: CPT

## 2025-04-03 PROCEDURE — G0378 HOSPITAL OBSERVATION PER HR: HCPCS

## 2025-04-03 PROCEDURE — S9443 LACTATION CLASS: HCPCS

## 2025-04-03 PROCEDURE — 83735 ASSAY OF MAGNESIUM: CPT

## 2025-04-03 PROCEDURE — 96365 THER/PROPH/DIAG IV INF INIT: CPT

## 2025-04-03 PROCEDURE — 36415 COLL VENOUS BLD VENIPUNCTURE: CPT

## 2025-04-03 PROCEDURE — 71275 CT ANGIOGRAPHY CHEST: CPT

## 2025-04-03 PROCEDURE — 99221 1ST HOSP IP/OBS SF/LOW 40: CPT | Performed by: OBSTETRICS & GYNECOLOGY

## 2025-04-03 PROCEDURE — 93970 EXTREMITY STUDY: CPT

## 2025-04-03 RX ORDER — IOPAMIDOL 755 MG/ML
90 INJECTION, SOLUTION INTRAVASCULAR
Status: COMPLETED | OUTPATIENT
Start: 2025-04-03 | End: 2025-04-03

## 2025-04-03 RX ORDER — ACETAMINOPHEN 325 MG/1
650 TABLET ORAL EVERY 4 HOURS PRN
Status: DISCONTINUED | OUTPATIENT
Start: 2025-04-03 | End: 2025-04-04 | Stop reason: HOSPADM

## 2025-04-03 RX ORDER — HYDRALAZINE HYDROCHLORIDE 20 MG/ML
10 INJECTION INTRAMUSCULAR; INTRAVENOUS EVERY 8 HOURS PRN
Status: DISCONTINUED | OUTPATIENT
Start: 2025-04-03 | End: 2025-04-04 | Stop reason: HOSPADM

## 2025-04-03 RX ORDER — SODIUM CHLORIDE 0.9 % (FLUSH) 0.9 %
5-40 SYRINGE (ML) INJECTION EVERY 12 HOURS SCHEDULED
Status: DISCONTINUED | OUTPATIENT
Start: 2025-04-03 | End: 2025-04-04 | Stop reason: HOSPADM

## 2025-04-03 RX ORDER — HYDRALAZINE HYDROCHLORIDE 20 MG/ML
5 INJECTION INTRAMUSCULAR; INTRAVENOUS ONCE
Status: COMPLETED | OUTPATIENT
Start: 2025-04-03 | End: 2025-04-03

## 2025-04-03 RX ORDER — SODIUM CHLORIDE 9 MG/ML
INJECTION, SOLUTION INTRAVENOUS PRN
Status: DISCONTINUED | OUTPATIENT
Start: 2025-04-03 | End: 2025-04-04 | Stop reason: HOSPADM

## 2025-04-03 RX ORDER — SODIUM CHLORIDE 0.9 % (FLUSH) 0.9 %
5-40 SYRINGE (ML) INJECTION PRN
Status: DISCONTINUED | OUTPATIENT
Start: 2025-04-03 | End: 2025-04-04 | Stop reason: HOSPADM

## 2025-04-03 RX ORDER — ONDANSETRON 4 MG/1
4 TABLET, ORALLY DISINTEGRATING ORAL EVERY 8 HOURS PRN
Status: DISCONTINUED | OUTPATIENT
Start: 2025-04-03 | End: 2025-04-04 | Stop reason: HOSPADM

## 2025-04-03 RX ORDER — ONDANSETRON 2 MG/ML
4 INJECTION INTRAMUSCULAR; INTRAVENOUS EVERY 6 HOURS PRN
Status: DISCONTINUED | OUTPATIENT
Start: 2025-04-03 | End: 2025-04-04 | Stop reason: HOSPADM

## 2025-04-03 RX ORDER — ENOXAPARIN SODIUM 100 MG/ML
40 INJECTION SUBCUTANEOUS DAILY
Status: DISCONTINUED | OUTPATIENT
Start: 2025-04-03 | End: 2025-04-03

## 2025-04-03 RX ORDER — DICLOXACILLIN SODIUM 250 MG/1
500 CAPSULE ORAL EVERY 6 HOURS
Status: DISCONTINUED | OUTPATIENT
Start: 2025-04-03 | End: 2025-04-04 | Stop reason: HOSPADM

## 2025-04-03 RX ORDER — LABETALOL HYDROCHLORIDE 5 MG/ML
5 INJECTION, SOLUTION INTRAVENOUS ONCE
Status: COMPLETED | OUTPATIENT
Start: 2025-04-03 | End: 2025-04-03

## 2025-04-03 RX ORDER — MAGNESIUM SULFATE IN WATER 40 MG/ML
4000 INJECTION, SOLUTION INTRAVENOUS ONCE
Status: COMPLETED | OUTPATIENT
Start: 2025-04-03 | End: 2025-04-03

## 2025-04-03 RX ADMIN — IOPAMIDOL 90 ML: 755 INJECTION, SOLUTION INTRAVENOUS at 05:56

## 2025-04-03 RX ADMIN — SODIUM CHLORIDE, PRESERVATIVE FREE 10 ML: 5 INJECTION INTRAVENOUS at 20:19

## 2025-04-03 RX ADMIN — MAGNESIUM SULFATE HEPTAHYDRATE 4000 MG: 40 INJECTION, SOLUTION INTRAVENOUS at 05:22

## 2025-04-03 RX ADMIN — LABETALOL HYDROCHLORIDE 5 MG: 5 INJECTION, SOLUTION INTRAVENOUS at 03:37

## 2025-04-03 RX ADMIN — HYDRALAZINE HYDROCHLORIDE 5 MG: 20 INJECTION INTRAMUSCULAR; INTRAVENOUS at 04:27

## 2025-04-03 ASSESSMENT — PAIN SCALES - GENERAL: PAINLEVEL_OUTOF10: 5

## 2025-04-03 ASSESSMENT — ENCOUNTER SYMPTOMS
BACK PAIN: 1
EYES NEGATIVE: 1
GASTROINTESTINAL NEGATIVE: 1
SHORTNESS OF BREATH: 1

## 2025-04-03 ASSESSMENT — PAIN DESCRIPTION - ORIENTATION: ORIENTATION: RIGHT;UPPER

## 2025-04-03 ASSESSMENT — PAIN - FUNCTIONAL ASSESSMENT: PAIN_FUNCTIONAL_ASSESSMENT: 0-10

## 2025-04-03 ASSESSMENT — PAIN DESCRIPTION - DESCRIPTORS: DESCRIPTORS: ACHING

## 2025-04-03 ASSESSMENT — PAIN DESCRIPTION - LOCATION: LOCATION: BACK

## 2025-04-03 NOTE — ED PROVIDER NOTES
[facility] EMERGENCY DEPARTMENT  EMERGENCY DEPARTMENT ENCOUNTER      Pt Name: Rusty Stallings  MRN: [id number]  Birthdate 1995  Date of evaluation: 4/3/2025  Provider: Obie Vazquez Jr, MD    CHIEF COMPLAINT       Chief Complaint   Patient presents with    Hypertension     About 15-20 min ago pt had chest pain and right upper back pain. Checked her bp and was 170/102 and 2nd was 180 sbp. Chest pain gone, but having the upper back pain. Headache earlier.     Back Pain     Delivered baby 6 days ago          HISTORY OF PRESENT ILLNESS   (Location/Symptom, Timing/Onset,Context/Setting, Quality, Duration, Modifying Factors, Severity)  Note limiting factors.   Rusty Stallings is a 30 y.o. female who presents to the emergency department for evaluation after having elevated blood pressure reading at home tonight.  She is 6 days postpartum.  Says she has had bilateral leg swelling since delivery but it was worse today than it has been which prompted her to check her blood pressure.  Says initially was in the 150s but then when she rechecked it went up to 170s and was at 1 point as high as 180 systolic.  Says she also developed some pain in her chest that radiated to her upper back that has since improved.  She says since delivery she has felt like she cannot take a deep breath in.  Says it was ongoing in the hospital, along with palpitations at 1 point, at which point she had a chest x-ray and EKG performed that were unremarkable.  She did not have issues with preeclampsia or hypertension during pregnancy or previously.    HPI    NursingNotes were reviewed.    REVIEW OF SYSTEMS    (2-9 systems for level 4, 10 or more for level 5)     Review of Systems   Constitutional: Negative.    HENT: Negative.     Eyes: Negative.    Cardiovascular:  Positive for chest pain and leg swelling.   Gastrointestinal: Negative.    Genitourinary: Negative.    Musculoskeletal:  Positive for back pain.   Skin: Negative.    Neurological:  Exam  Vitals and nursing note reviewed.   Constitutional:       General: She is not in acute distress.     Appearance: Normal appearance. She is well-developed. She is not diaphoretic.   HENT:      Head: Normocephalic and atraumatic.      Mouth/Throat:      Pharynx: No oropharyngeal exudate.   Eyes:      General: No scleral icterus.     Pupils: Pupils are equal, round, and reactive to light.   Neck:      Trachea: No tracheal deviation.   Cardiovascular:      Rate and Rhythm: Normal rate.      Pulses: Normal pulses.      Heart sounds: Normal heart sounds.   Pulmonary:      Effort: Pulmonary effort is normal.      Breath sounds: Normal breath sounds. No stridor. No wheezing or rhonchi.   Abdominal:      General: There is no distension.      Palpations: Abdomen is soft. Abdomen is not rigid.      Tenderness: There is no abdominal tenderness. There is no guarding.      Hernia: No hernia is present.   Musculoskeletal:         General: No deformity.      Cervical back: Normal range of motion.   Skin:     General: Skin is warm and dry.      Findings: No rash.   Neurological:      Mental Status: She is alert and oriented to person, place, and time.      Cranial Nerves: No cranial nerve deficit.      Coordination: Coordination normal.   Psychiatric:         Behavior: Behavior normal.         DIAGNOSTIC RESULTS       RADIOLOGY:   Non-plain film images such as CT, Ultrasound and MRI are read by the radiologist. Plainradiographic images are visualized and preliminarily interpreted by the emergency physician with the below findings:      Interpretation per the Radiologist below, if available at the time of this note:    CTA PULMONARY W CONTRAST   Final Result   1. No pulmonary embolus.   2. Findings suggestive of mild pulmonary edema.   3. Multiple groundglass and noncalcified nodules within both lungs. These are basilar predominant. Dominant nodule within the right lung measures up to 7 mm. These are most likely

## 2025-04-03 NOTE — ED NOTES
ED TO INPATIENT SBAR HANDOFF    Patient Name: Rusty Stallings   : 1995  30 y.o.   Family/Caregiver Present: Yes  Code Status Order: No Order    C-SSRS:    Sitter   Restraints:         Situation  Chief Complaint:   Chief Complaint   Patient presents with    Hypertension     About 15-20 min ago pt had chest pain and right upper back pain. Checked her bp and was 170/102 and 2nd was 180 sbp. Chest pain gone, but having the upper back pain. Headache earlier.     Back Pain     Delivered baby 6 days ago      Patient Diagnosis: No admission diagnoses are documented for this encounter.     Brief Description of Patient's Condition: Rusty Stallings is a 30 y.o. female who presents to the emergency department for evaluation after having elevated blood pressure reading at home tonight.  She is 6 days postpartum.  Says she has had bilateral leg swelling since delivery but it was worse today than it has been which prompted her to check her blood pressure.  Says initially was in the 150s but then when she rechecked it went up to 170s and was at 1 point as high as 180 systolic.  Says she also developed some pain in her chest that radiated to her upper back that has since improved.  She says since delivery she has felt like she cannot take a deep breath in.  Says it was ongoing in the hospital, along with palpitations at 1 point, at which point she had a chest x-ray and EKG performed that were unremarkable.  She did not have issues with preeclampsia or hypertension during pregnancy or previously  Mental Status: oriented, alert, coherent, logical, thought processes intact, and able to concentrate and follow conversation  Arrived from: home    Imaging:   XR CHEST PORTABLE    (Results Pending)   Vascular duplex lower extremity venous bilateral    (Results Pending)   CTA PULMONARY W CONTRAST    (Results Pending)     COVID-19 Results:   Internal Administration   First Dose COVID-19, MODERNA BLUE border, Primary or

## 2025-04-03 NOTE — H&P
HISTORY AND PHYSICAL             Date: 4/3/2025        Patient Name: Rusty Stallings       YOB: 1995      Age:  30 y.o.  MRN: [id number]      Chief Complaint     Chief Complaint   Patient presents with    Hypertension     About 15-20 min ago pt had chest pain and right upper back pain. Checked her bp and was 170/102 and 2nd was 180 sbp. Chest pain gone, but having the upper back pain. Headache earlier.     Back Pain     Delivered baby 6 days ago       Pre-eclampsia      History Obtained From   Patient    Rusty Stallings is a 30 y.o. female  PPD#6 S/P , and presents with elevated BPs and back pain. Pt admits to SOB, chest pain, and headache.  No LMP recorded. with Estimated Date of Delivery: None noted..        Past Medical History     Past Medical History:   Diagnosis Date    ADD (attention deficit disorder)     Anxiety     GERD (gastroesophageal reflux disease)     Ovarian cyst     x2, ruptured      OB History   No obstetric history on file.        Past Surgical History     Past Surgical History:   Procedure Laterality Date    UPPER GASTROINTESTINAL ENDOSCOPY      went through her mouth    WISDOM TOOTH EXTRACTION          Medications Prior to Admission     Prior to Admission medications    Medication Sig Start Date End Date Taking? Authorizing Provider   Prenatal Multivit-Min-Fe-FA (PRE-NORA PO) Take by mouth   Yes Provider, Wanda, MD        Allergies   Patient has no known allergies.    Social History     Social History       Tobacco History       Smoking Status  Never      Smokeless Tobacco Use  Never              Alcohol History       Alcohol Use Status  Yes Comment  rare              Drug Use       Drug Use Status  No              Sexual Activity       Sexually Active  Not Currently                    Family History   History reviewed. No pertinent family history.    Review of Systems   Review of Systems   Constitutional: Negative.    Respiratory:  Positive for shortness of breath.     Cardiovascular:  Positive for chest pain and leg swelling.   Genitourinary:         Mastitis   Musculoskeletal:  Positive for back pain.   Neurological:  Positive for dizziness and headaches.   All other systems reviewed and are negative.      Physical Exam   BP (!) 152/83   Pulse 50   Temp 97.9 °F (36.6 °C) (Temporal)   Resp 16   Ht 1.753 m (5' 9\")   Wt 77.1 kg (170 lb)   SpO2 99%   BMI 25.10 kg/m²     Constitutional: NAD, normal appearance, not ill-appearing  HENT: NC/AT  Neck: supple  Lungs: CTA bilaterally  Cardio: [id number] no M/G/R  Abd: soft, NT, ND, bowel sounds present  Extremities: no C/C/E  Neurological: No focal deficits. Mental Status A&Ox3  Skin: warm and dry  Psychiatric: mood, affect and behavior normal    Labs      Recent Results (from the past 24 hours)   CBC with Auto Differential    Collection Time: 04/03/25  2:45 AM   Result Value Ref Range    WBC 10.3 4.8 - 10.8 K/uL    RBC 3.59 (L) 4.20 - 5.40 M/uL    Hemoglobin 10.9 (L) 12.0 - 16.0 g/dL    Hematocrit 33.0 (L) 37.0 - 47.0 %    MCV 91.9 81.0 - 99.0 fL    MCH 30.4 27.0 - 31.0 pg    MCHC 33.0 33.0 - 37.0 g/dL    RDW 13.0 11.5 - 14.5 %    Platelets 255 130 - 400 K/uL    MPV 9.9 9.4 - 12.3 fL    Neutrophils % 64.5 50.0 - 65.0 %    Lymphocytes % 22.9 20.0 - 40.0 %    Monocytes % 6.7 0.0 - 10.0 %    Eosinophils % 4.1 0.0 - 5.0 %    Basophils % 0.7 0.0 - 1.0 %    Neutrophils Absolute 6.6 1.5 - 7.5 K/uL    Immature Granulocytes # 0.1 K/uL    Lymphocytes Absolute 2.4 1.1 - 4.5 K/uL    Monocytes Absolute 0.70 0.00 - 0.90 K/uL    Eosinophils Absolute 0.40 0.00 - 0.60 K/uL    Basophils Absolute 0.10 0.00 - 0.20 K/uL   CMP    Collection Time: 04/03/25  2:45 AM   Result Value Ref Range    Sodium 138 136 - 145 mmol/L    Potassium 3.8 3.5 - 5.1 mmol/L    Chloride 105 98 - 107 mmol/L    CO2 21 (L) 22 - 29 mmol/L    Anion Gap 12 8 - 16 mmol/L    Glucose 112 (H) 70 - 99 mg/dL    BUN 12 6 - 20 mg/dL    Creatinine 0.6 0.5 - 0.9 mg/dL    Est, Glom Filt Rate

## 2025-04-03 NOTE — LACTATION NOTE
Mother states she delivered at [facility] 7 days ago, this is her first baby. States she has been seeing Estelita Perez IBCLC at [facility]. States she visited her day 5 and Estelita said she did have mastitis. Mother states she came to the hospital this morning due to high bp. And was dx with pre-eclampsia and treated with mag.  Mother states she is feeling much better. Our nursery nurse took mother a hospital grade electric pump this morning with supplies. The pump was not set up and mother states she has not pumped and that she has just hand expressed and discarded EBM.  Reminded mother the importance of pumping every 2-3 hours with our electric breast pump to maintain adequate stimulation and removal of milk so her body will make more and so she will not develop mastitis again. Informed her Mastitis can occur if her breast are not emptied effectively and often. Mother states she is aware. Informed mother that she can feed baby EBM  mag, mother states she was given something in the ED and she wishes not to feed baby EBM, encouragement and support given. Set our electric breast pump up, mother knows to clean parts with hot soapy water and rinse well for next use. Several graduates given as needed, along with soap, basin and pacifiers.   Information and handout regarding Lymphatic Massage given  Reduces swelling by assisting movement of lymph fluid, decreasing edema  Technique:  1. \"very gentle touch/traction of skin- \"like petting a cat\" using coconut oil if needed.  The purpose is to lift skin to allow flow of lymphatic drainage and vascular decongestion  2. Ten small circles at junction of internal jugular and subclavian veins  3. Ten small circles in axilla  4. Continue with light touch massage from nipple towards clavicle, axilla    Encouraged her to call OB for lactation if needed. All questions answered at this time.  Mother states her breast are not sore at all and do not hurt and not engorged  States she has a Spectra

## 2025-04-03 NOTE — PROGRESS NOTES
Breast pump, pumping supplies, wash basin, bottles, etc delivered to postpartum mother in room 434. Patient states she knows how to use pump. No questions at this time. Told patient to let her nurse know to contact OB floor if they need anything else from OB.

## 2025-04-03 NOTE — PROGRESS NOTES
4 Eyes Skin Assessment     NAME:  Rusty Stallings  YOB: 1995  MEDICAL RECORD NUMBER:  [id number]    The patient is being assessed for  Admission    I agree that at least one RN has performed a thorough Head to Toe Skin Assessment on the patient. ALL assessment sites listed below have been assessed.      Areas assessed by both nurses:    Head, Face, Ears, Shoulders, Back, Chest, Arms, Elbows, Hands, Sacrum. Buttock, Coccyx, Ischium, Legs. Feet and Heels, and Under Medical Devices         Does the Patient have a Wound? No noted wound(s)       Lewis Prevention initiated by RN: No  Wound Care Orders initiated by RN: No    Pressure Injury (Stage 3,4, Unstageable, DTI, NWPT, and Complex wounds) if present, place Wound referral order by RN under : No    New Ostomies, if present place, Ostomy referral order under : No     Nurse 1 eSignature: Electronically signed by Afsaneh Izquierdo RN on 4/3/25 at 7:51 AM CDT    **SHARE this note so that the co-signing nurse can place an eSignature**    Nurse 2 eSignature: Electronically signed by Kayla Villaseñor RN on 4/3/25 at 7:51 AM CDT

## 2025-04-04 ENCOUNTER — TELEPHONE (OUTPATIENT)
Dept: OBSTETRICS AND GYNECOLOGY | Age: 30
End: 2025-04-04
Payer: COMMERCIAL

## 2025-04-04 VITALS
TEMPERATURE: 98.1 F | DIASTOLIC BLOOD PRESSURE: 89 MMHG | WEIGHT: 170 LBS | HEART RATE: 46 BPM | RESPIRATION RATE: 18 BRPM | OXYGEN SATURATION: 99 % | HEIGHT: 69 IN | BODY MASS INDEX: 25.18 KG/M2 | SYSTOLIC BLOOD PRESSURE: 139 MMHG

## 2025-04-04 LAB
ALBUMIN SERPL-MCNC: 3.5 G/DL (ref 3.5–5.2)
ALP SERPL-CCNC: 100 U/L (ref 35–104)
ALT SERPL-CCNC: 79 U/L (ref 10–35)
ANION GAP SERPL CALCULATED.3IONS-SCNC: 9 MMOL/L (ref 8–16)
AST SERPL-CCNC: 31 U/L (ref 10–35)
BASOPHILS # BLD: 0.1 K/UL (ref 0–0.2)
BASOPHILS NFR BLD: 0.5 % (ref 0–1)
BILIRUB SERPL-MCNC: 0.2 MG/DL (ref 0.2–1.2)
BUN SERPL-MCNC: 11 MG/DL (ref 6–20)
CALCIUM SERPL-MCNC: 10.1 MG/DL (ref 8.6–10)
CHLORIDE SERPL-SCNC: 106 MMOL/L (ref 98–107)
CO2 SERPL-SCNC: 24 MMOL/L (ref 22–29)
CREAT SERPL-MCNC: 0.7 MG/DL (ref 0.5–0.9)
EOSINOPHIL # BLD: 0.3 K/UL (ref 0–0.6)
EOSINOPHIL NFR BLD: 3.1 % (ref 0–5)
ERYTHROCYTE [DISTWIDTH] IN BLOOD BY AUTOMATED COUNT: 13 % (ref 11.5–14.5)
GLUCOSE SERPL-MCNC: 96 MG/DL (ref 70–99)
HCT VFR BLD AUTO: 35.2 % (ref 37–47)
HGB BLD-MCNC: 11.3 G/DL (ref 12–16)
IMM GRANULOCYTES # BLD: 0.1 K/UL
LYMPHOCYTES # BLD: 2.3 K/UL (ref 1.1–4.5)
LYMPHOCYTES NFR BLD: 21.3 % (ref 20–40)
MCH RBC QN AUTO: 29.6 PG (ref 27–31)
MCHC RBC AUTO-ENTMCNC: 32.1 G/DL (ref 33–37)
MCV RBC AUTO: 92.1 FL (ref 81–99)
MONOCYTES # BLD: 0.9 K/UL (ref 0–0.9)
MONOCYTES NFR BLD: 8.2 % (ref 0–10)
NEUTROPHILS # BLD: 7.2 K/UL (ref 1.5–7.5)
NEUTS SEG NFR BLD: 65.9 % (ref 50–65)
PLATELET # BLD AUTO: 275 K/UL (ref 130–400)
PMV BLD AUTO: 10.1 FL (ref 9.4–12.3)
POTASSIUM SERPL-SCNC: 4 MMOL/L (ref 3.5–5.1)
PROT SERPL-MCNC: 6 G/DL (ref 6.4–8.3)
RBC # BLD AUTO: 3.82 M/UL (ref 4.2–5.4)
SODIUM SERPL-SCNC: 139 MMOL/L (ref 136–145)
WBC # BLD AUTO: 10.9 K/UL (ref 4.8–10.8)

## 2025-04-04 PROCEDURE — G0378 HOSPITAL OBSERVATION PER HR: HCPCS

## 2025-04-04 PROCEDURE — 99238 HOSP IP/OBS DSCHRG MGMT 30/<: CPT | Performed by: OBSTETRICS & GYNECOLOGY

## 2025-04-04 PROCEDURE — 85025 COMPLETE CBC W/AUTO DIFF WBC: CPT

## 2025-04-04 PROCEDURE — 80053 COMPREHEN METABOLIC PANEL: CPT

## 2025-04-04 PROCEDURE — 36415 COLL VENOUS BLD VENIPUNCTURE: CPT

## 2025-04-04 PROCEDURE — 2500000003 HC RX 250 WO HCPCS: Performed by: OBSTETRICS & GYNECOLOGY

## 2025-04-04 RX ADMIN — SODIUM CHLORIDE, PRESERVATIVE FREE 10 ML: 5 INJECTION INTRAVENOUS at 08:20

## 2025-04-04 NOTE — DISCHARGE SUMMARY
Discharge Summary    Rusty Stallings  :  1995  MRN:  [id number]    ADMIT DATE:  4/3/2025  DISCHARGE DATE:  2025    PRIMARY CARE PHYSICIAN:  Mat Oquendo MD    VISIT STATUS: Admission    CODE STATUS:  Full Code    DISCHARGE DIAGNOSES:  Principal Problem:    Preeclampsia in postpartum period  Resolved Problems:    * No resolved hospital problems. *      HOSPITAL COURSE:  31y/o  PPD#7 S/P  HD#2 admitted with pre-eclampsia with severe features, and pt is hemodynamically stable. Pt is S/P magnesium sulfate for seizure prophylaxis. Pt is presently asymptomatic, and LFTs are improving. Pt's hospital course was uncomplicated. Pt was discharged home on HD#2 in stable condition. Pt will follow-up with OB provider next week.  SIGNIFICANT DIAGNOSTIC STUDIES:  CT chest  CONSULTANTS:  None  RECOMMENDED NEXT STEPS:   Postpartum follow-up in 1 week     Objective:  VSS  General - WDWN female in NAD  Neck - supple  Lungs - CTA bilaterally  Cardio - [id number] no M/G/R  Abd - soft, NT, ND, bowel sounds present  Extremities - no C/C/E    DISCHARGE MEDICATIONS:         Medication List        CONTINUE taking these medications      PRE-NORA PO            STOP taking these medications      naproxen sodium 550 MG tablet  Commonly known as: Anaprox DS     sertraline 50 MG tablet  Commonly known as: ZOLOFT              DIET: ADULT DIET; Regular    ACTIVITY: Pelvic rest  ______________________________________________________________________  COMPLEXITY OF FOLLOW UP:   [] Moderate Complexity: follow up within 7-14 calendar days (13907)   [x] Severe Complexity: follow up within 7 calendar days (62162)    FOLLOW UP TESTING, PENDING RESULTS OR REFERRALS AT TRANSITIONAL CARE VISIT:   []  Yes    [x]  No    PENDING STUDIES:   None    DISPOSITION: Home    FACILITY/HOME CARE AGENCY NAME: N/A    Follow up with Gary Orellana MD  [address] [address]  [phone]    Go on 2025          INSTRUCTIONS TO MA/SW: Please call patient on day after discharge (must document patient  contacted within 2 business days of discharge).    FOLLOW UP QUESTIONS FOR MA/SW:  1. Did you get medications filled and taking them as instructed from discharge?  2. Are you following your discharge instructions from your hospital stay?  3. Please confirm patient is scheduled for a follow up appointment within the above time frame.    DISCHARGE TIME: < 30 minutes    SIGNED:  Martin March MD   4/4/2025, 9:41 AM

## 2025-04-04 NOTE — TELEPHONE ENCOUNTER
Janel Carreno calling to request f/u appt for pt following dx from hospital. They report admission d/t elevated Blood pressures and chest pain. Pt is scheduled f/u 4/8 @11 am with Dr. San per request.

## 2025-04-05 ENCOUNTER — HOSPITAL ENCOUNTER (OUTPATIENT)
Dept: LACTATION | Facility: HOSPITAL | Age: 30
Discharge: HOME | End: 2025-04-05
Payer: COMMERCIAL

## 2025-04-05 NOTE — LACTATION NOTE
"Mother's Name:                     Jesus Trevino  G/P:                                        1/1  Significant Medical History:  anxiety, depression, anorexia, bulemia, GERD  Maternal Breast Assessment:  No signs/symptoms mastitis whatsoever, milk pouring from breasts.No nipple trauma. Slight firmness RUQ.   Support Person;                      , Cordell  Return to Work                        , inpatient/outpatient hospital [facility] Medical: PRN, max 2 days/wk after maternity leave                                                  Supportive employer      Infant's Name:                       Osmin Trevino  Date of Birth:                           [birth year]  Gestational age at Birth:         36-5  Age:                                         8 days  Physician:                                FLAVIA Buckley DO                 Reason for Visit:                     Evaluation of mastitis, infant weight check, assistance with at-breast feeding, transfer at breast eval                     Infant's Birth weight:                6-10.2             3010g  Katharine+  Previous Weight:                     6-3.3               2815g  Wt Loss:  6.5%  Previous Weight;  6-3.8                 2828g          Wt Loss:  6.0%  Last Weight   6-2.8  2802g  Wt Loss:  6.9%     Today's Weight:                    6-4.0                2835g                          Wt Loss: 5.8 %      Feeding History Since Discharge/Last Lactation Appt.:    Pt dc'ed yesterday from [facility] admit for 1 1/2 days for preeclampsia. Was on IV/mag. Prior to this pt was pumping 65 mls per session. Infant was given EBM/formula for all feeds with Paced bottle feeding during pt's hospital stay.  Pt pumped while inpt in hospital because she requested to do so. Dad uses own thighs as \"guardrails\" on each side of infant (lying her between them) as she lays on side to give all bottles per paced horizontal method. He has taught grandmothers to do the same.  " "Mom says breasts are full prior to bfing and softer afterward.   Hennya: Pt has been using with little result. She was unaware to squeeze body of bottle to create vacuum while attached to breast. Educated on same. Pt plans to use every time she bf's, correctly, to begin having stored milk in case of another emergency like her recent hospital admission.     Nighttime Crying:  Parents report that Osmin wakes around 12-1 am for a feed and does NOT stop crying until approx 7 am. This has gone on the past 4-5 days. They desperately desire info on dealing with this. They assure infant is full by giving EBM supplement with paced method. Infant requires 30-6- mins to \"settle\" in arms. When Osmin is laid in crib, she immediately begins crying. Tips given for warming sleeping surface w heating pad, and removing pad, just prior to lying Osmin down. Placing folded bath towel under head of mattress to allow for very slight elevation. Parents inquire about using fabric insert in crib \"Keesley\" with abdominal velcro belt to aid sleep. Informed them I could not endorse using it as I was unfamiliar with same.       Past 24 Hours Voids/Stools:    6+ /  6+ in 24 hours. Color of Stool: yellow loose, stingy, slimy     Time at Breast   (Last feeding was 3 hours prior to consult:   bfing R 10 mins plus 40 mls EBM, and 10 mls formula)  Right  Breast:   25 min  6-4.6  +19 mls  Left Breast:     20 mins 6-5.4  +25 mls                             Total Minutes:      45       Total Weight Gain:    44    gms or  1 1/2 oz     Average Feeding Amount for Age: 60-90 mls, or 2-3 oz every 2-3 hours, or sooner if hunger cues are present; or 8-12 times in 24 hours.     Interventions:  Minor assistance given for latching at breast. Mother very proficient at deep latching in cross cradle hold. Pre/post feed weights taken. Parenting tips shared.      Notified MD/ Orders Received:   NA     Feeding Plan:   Keep doing what you are doing. Great job!  Remember " , firm pats top of diaper, while belly is on your collar bone  Lean to keep her into your neck.  Remember sleeping tips; towel under head of mattress, heating pad on surface just prior to lying her down, rice glove momentarily.   Holding skin to skin as often as possible.       Plan of   Interventions require further assessment with [facility] Lactation  Interventions require further assessment with MD     Future Appointments:  Lactation:        4/10/25 Thursday, 1 pm  Physician:        Dr. Buckley as ordered  Signature:        Eloisa Lockett IBCLC  Date:                25

## 2025-04-08 ENCOUNTER — POSTPARTUM VISIT (OUTPATIENT)
Dept: OBSTETRICS AND GYNECOLOGY | Age: 30
End: 2025-04-08
Payer: COMMERCIAL

## 2025-04-08 VITALS
WEIGHT: 175 LBS | SYSTOLIC BLOOD PRESSURE: 130 MMHG | BODY MASS INDEX: 25.92 KG/M2 | DIASTOLIC BLOOD PRESSURE: 84 MMHG | HEIGHT: 69 IN

## 2025-04-08 RX ORDER — OMEPRAZOLE 20 MG/1
TABLET, DELAYED RELEASE ORAL
COMMUNITY
Start: 2024-12-01

## 2025-04-08 NOTE — PROGRESS NOTES
Sanchez San MD  [facility]  [address]  [place] [address]  Office [phone]  Fax [phone]       Jayy Trevino  : 1995  MRN: [id number]    Subjective   Subjective     Chief Complaint   Patient presents with    Postpartum Care     Pt here for postpartum visit. Delivered vaginal on 3/28/25 at 36w5d. Pt seen in [facility] ER 4/3/25 due to high BP (180/102), chest pain and swelling in feet. Patient reports overall feeling better, other than being exhausted from having a new born. BP yesterday 136/89. PPD screening score 1.       History of Present Illness  Postpartum Visit  Patient is here for a postpartum visit. She is >1 weeks postpartum following a spontaneous vaginal delivery.   Pregnancy complicated by:   36 weeks    labor with delivery    Postpartum course has been notable for anxiety. Was started on zoloft during initial postpartum care.     Baby's course has been uncomplciated.     4/3 - Wasn't feeling well. Some chest tightness and took BP and it was severe range in the 180s. Went to [facility] ER as was the closet. Admitted for postpartum preeclampsia. Received magnesium at [facility]. Discharged on .     Baby is feeding by breast. Bleeding moderate lochia. Bowel function is normal. Bladder function is normal. Patient is not sexually active. Contraception method is abstinence. Postpartum depression screening: negative. Denies preeclampsia symptoms currently.    Review of Systems   Constitutional:  Negative for activity change, appetite change, chills, fatigue and fever.   Respiratory:  Negative for cough and shortness of breath.    Cardiovascular:  Negative for chest pain and leg swelling.   Gastrointestinal:  Negative for abdominal pain, constipation, diarrhea, nausea and vomiting.   Genitourinary:  Negative for decreased urine volume, dysuria, frequency, menstrual problem, pelvic pain, urgency, vaginal bleeding, vaginal discharge and vaginal pain.  "  Psychiatric/Behavioral:  Negative for decreased concentration, dysphoric mood, self-injury, sleep disturbance and suicidal ideas. The patient is not nervous/anxious.    All other systems reviewed and are negative.         Objective    Objective     Vitals:   Visit Vitals  /84 (BP Location: Left arm, Patient Position: Sitting, Cuff Size: Adult)   Ht 175.3 cm (69\")   Wt 79.4 kg (175 lb)   LMP 07/15/2024 (Exact Date)   Breastfeeding Yes   BMI 25.84 kg/m²        Physical Exam  Vitals and nursing note reviewed.   Constitutional:       General: She is not in acute distress.     Appearance: Normal appearance. She is not ill-appearing.   HENT:      Head: Normocephalic and atraumatic.      Nose: No congestion or rhinorrhea.   Eyes:      General: No scleral icterus.        Right eye: No discharge.         Left eye: No discharge.      Extraocular Movements: Extraocular movements intact.      Conjunctiva/sclera: Conjunctivae normal.   Pulmonary:      Effort: Pulmonary effort is normal. No accessory muscle usage or respiratory distress.   Abdominal:      General: Abdomen is flat.      Palpations: Abdomen is soft.      Tenderness: There is no abdominal tenderness.   Musculoskeletal:      Right lower leg: No edema.      Left lower leg: No edema.   Skin:     General: Skin is warm and dry.      Coloration: Skin is not ashen, cyanotic or jaundiced.   Neurological:      General: No focal deficit present.      Mental Status: She is alert and oriented to person, place, and time.      Deep Tendon Reflexes: Reflexes normal.   Psychiatric:         Mood and Affect: Mood normal.         Behavior: Behavior is cooperative.           Result Review    Postpartum Depression: Low Risk  (2025)    [place]  Depression Scale     Last EPDS Total Score: 1     Last EPDS Self Harm Result: Never   Recent Concern: Postpartum Depression - Medium Risk (3/30/2025)    [place]  Depression Scale     Last EPDS Total Score: 7     " Last EPDS Self Harm Result: Never       Preelampsia Labs:    Results from last 7 days   Lab Units 04/04/25  0133   WBC K/uL 10.9*   HEMOGLOBIN g/dL 11.3*   HEMATOCRIT % 35.2*   PLATELETS K/uL 275   POTASSIUM mmol/L 4   ALT (SGPT) U/L 79*   AST (SGOT) U/L 31   CREATININE mg/dL 0.7   BILIRUBIN mg/dL 0.2     CBC WITH AUTO DIFFERENTIAL (04/03/2025 03:45 EDT)  Hgb 10.9      COMPREHENSIVE METABOLIC PANEL (04/03/2025 03:45 EDT)  Cr 0.6  ALT 93  AST 61    BNP (IN-HOUSE) (04/03/2025 03:45 EDT) 505    XR Chest 1 View (04/03/2025 04:22 EDT)   Unexpected finding: Borderline cardiomegaly, pulmonary venous congestion, and mild interstitial edema.     CT Angiogram Chest Pulmonary Embolism (04/03/2025 06:56 EDT)   1. No pulmonary embolus.   2. Findings suggestive of mild pulmonary edema.   3. Multiple groundglass and noncalcified nodules within both lungs. These are basilar predominant. Dominant nodule within the right lung measures up to 7 mm. These are most likely infectious/inflammatory.         Assessment & Plan   Assessment / Plan     Diagnoses and all orders for this visit:    1. Postpartum follow-up (Primary)    2. Preeclampsia in postpartum period      Meeting postpartum milestones. Restrictions still present.   Blood pressure ok today. Chart reviewed from [facility]. Postpartum preeclampsia with fluid overload. She has lost almost 30 lbs since delivery.  Recommend return for recheck 1 week. She is monitoring Bps at home - continue to monitor.   Questions answered. She expressed understanding.     Return in about 1 week (around 4/15/2025) for postpartum.      Sanchez San MD

## 2025-04-09 ENCOUNTER — MATERNAL SCREENING (OUTPATIENT)
Dept: CALL CENTER | Facility: HOSPITAL | Age: 30
End: 2025-04-09
Payer: COMMERCIAL

## 2025-04-09 NOTE — OUTREACH NOTE
Maternal Screening Survey      Flowsheet Row Responses   Facility patient discharged from? [facility]   Attempt successful? No   Unsuccessful attempts Attempt 1              Yifan RAMIREZ - Registered Nurse

## 2025-04-09 NOTE — OUTREACH NOTE
Maternal Screening Survey      Flowsheet Row Responses   Facility patient discharged from? [facility]   Attempt successful? Yes   Call start time 1107   Call end time 1110   Person spoke with today (if not patient) and relationship Patient   I have been able to laugh and see the funny side of things. 0   I have looked forward with enjoyment to things. 0   I have blamed myself unnecessarily when things went wrong. 0   I have been anxious or worried for no good reason. 0   I have felt scared or panicky for no good reason. 0   Things have been getting on top of me. 0   I have been so unhappy that I have had difficulty sleeping. 0   I have felt sad or miserable. 0   I have been so unhappy that I have been crying. 0   The thought of harming myself has occurred to me. 0   [place]  Depression Scale Total 0   Did any of your parents have problems with alcohol or drug use? No   Do any of your peers have problems with alcohol or drug use? No   Does your partner have problems with alcohol or drug use? No   Before you were pregnant did you have problems with alcohol or drug use? (past) No   In the past month, did you drink beer, wine, liquor or use any other drugs? (pregnancy) No   Maternal Screening call completed Yes   Call end time 1110              Yifan RAMIREZ - Registered Nurse

## 2025-04-10 ENCOUNTER — TELEPHONE (OUTPATIENT)
Dept: LACTATION | Facility: HOSPITAL | Age: 30
End: 2025-04-10
Payer: COMMERCIAL

## 2025-04-10 ENCOUNTER — HOSPITAL ENCOUNTER (OUTPATIENT)
Dept: LACTATION | Facility: HOSPITAL | Age: 30
Discharge: HOME | End: 2025-04-10
Payer: COMMERCIAL

## 2025-04-10 LAB
EKG P AXIS: 59 DEGREES
EKG P-R INTERVAL: 172 MS
EKG Q-T INTERVAL: 440 MS
EKG QRS DURATION: 88 MS
EKG QTC CALCULATION (BAZETT): 407 MS
EKG T AXIS: 58 DEGREES

## 2025-04-10 NOTE — TELEPHONE ENCOUNTER
Pt did not present for OP lactation appmt today.     Sent pt msg inquiring about bfing status, if there had been any problems since last visit, and how infant was sleeping. (Osmin had previously been crying for several nights in a row 1-7 am.) Offered another OP lactation appmt if desired, or phone consult. Number given.

## 2025-04-10 NOTE — LACTATION NOTE
Mother's Name:                     Jesus Trevino  G/P:                                        1/1  Significant Medical History:   anxiety, depression, anorexia, bulemia, GERD  Maternal Breast Assessment:  No signs/symptoms mastitis, milk pouring from breasts. No nipple trauma.   Support Person;                      , Cordell  Return to Work                        , inpatient/outpatient hospital [facility] Medical: PRN, max 2 days/wk after maternity leave                                                  Supportive employer      Infant's Name:                       Osmin Trevino  Date of Birth:                           [birth year]  Gestational age at Birth:         36-5  Age:                                         13 days  Physician:                                FLAVIA Buckley DO                 Reason for Visit:                     Evaluation of mastitis, infant weight check, assistance with at-breast feeding, transfer at breast eval                     Infant's Birth weight:                6-10.2             3010g  Katharine+  Previous Weight:                     6-3.3               2815g              Wt Loss:  6.5%  Previous Weight;                     6-3.8                 2828g          Wt Loss:  6.0%  Previous Weight  6-2.8                2802g              Wt Loss:  6.9%  Last Weight;   6-4.0  2835g  Wt Loss:  5.8%    Today's Weight:          6-6.1  2896g  Wt Loss:  3.8%                Feeding History Since Discharge/Last Lactation Appt.: Mom bf's and gives EBM supplement after every feed; EBM 40-70 mls. Nighttime; mom foregoes bfing. She gives bottle with paced method and pumps for 20 mins approx twice per day. Yoel collected 110 mls max during a 20 mins session.  Mom also pumps 10 mins AFTER every bfing. She collects approx 60 mls with each of these sessions.     Nighttime Crying:  Mom reports that this has improved.       Past 24 Hours Voids/Stools: plenty of each     Color of Stool:    yellow    Time at Breast     Left Breast: 15 mins + 6 mls  Right Breast: 20 mins   +   34                                        Total Minutes:     35       Total Weight Gain:    40 mls  or 1 1/2 oz      Average Feeding Amount for Age: 60-90 mls, or 2-3 oz every 2-3 hours, or sooner if hunger cues are present; or 8-12 times in 24 hours.     Interventions:  No assistance needed for deep latching in cross cradle hold. Taught football hold. Infant required stimulation to stay active at breast. Some large jaw-dropping sucks noted. However, hardly no transfer when assessed afterward. Urged mom to keep infant's lower chin supported under breast while feeding. Infant drank better on R breast, though mostly pacifying.      Feeding Plan:   When latched, maintain chin support for Osmin to keep seal around  breast in order to make feed productive.   Keep her awake entire time at breast.   Continue to offer EBm supplements after bfin-60 mls (or more if feel she needs it.) Try to let her be more hungry when she comes to breast.  Try to keep her swallowing.    Plan of   Further Interventions required with [facility] Lactation     Future Appointments:  Lactation:        Wednesday, 25, 11am  Physician:        Dr. Buckley as ordered  Signature:        Eloisa Lockett IBCLC  Date:                4/10/25

## 2025-04-17 ENCOUNTER — POSTPARTUM VISIT (OUTPATIENT)
Dept: OBSTETRICS AND GYNECOLOGY | Age: 30
End: 2025-04-17
Payer: COMMERCIAL

## 2025-04-17 VITALS
DIASTOLIC BLOOD PRESSURE: 72 MMHG | WEIGHT: 174.6 LBS | BODY MASS INDEX: 25.86 KG/M2 | HEIGHT: 69 IN | SYSTOLIC BLOOD PRESSURE: 114 MMHG

## 2025-04-17 DIAGNOSIS — Z30.09 ENCOUNTER FOR OTHER GENERAL COUNSELING OR ADVICE ON CONTRACEPTION: ICD-10-CM

## 2025-04-17 RX ORDER — SERTRALINE HYDROCHLORIDE 25 MG/1
TABLET, FILM COATED ORAL
COMMUNITY

## 2025-04-17 NOTE — PROGRESS NOTES
"      Sanchez San MD  [facility]  [address]  [place] [address]  Office [phone]  Fax [phone]       Jayy Trevino  : 1995  MRN: [id number]    Subjective   Subjective     Chief Complaint   Patient presents with    Postpartum Care     Pt here for postpartum visit. Delivered vaginal on 3/28/25 at 36w5d.   Pt states BP has been lower and pulse is coming back up.  Pt states she is still having some bleeding.  PPDS: 1       History of Present Illness  Postpartum Visit  Patient is here for a postpartum visit. She is 2 weeks postpartum following a spontaneous vaginal delivery.     Baby is feeding by breast. Bleeding thin lochia. Bowel function is normal. Bladder function is normal. Patient is not sexually active. Contraception method is abstinence. Postpartum depression screening: negative. Denies preeclampsia symptoms. Pulse 86 bpm    Objective    Objective     Vitals:   Visit Vitals  /72   Ht 175.3 cm (69\")   Wt 79.2 kg (174 lb 9.6 oz)   Breastfeeding Yes   BMI 25.78 kg/m²        Physical Exam  Vitals and nursing note reviewed.   Constitutional:       General: She is not in acute distress.     Appearance: Normal appearance. She is not ill-appearing.   HENT:      Head: Normocephalic and atraumatic.      Nose: No congestion or rhinorrhea.   Eyes:      General: No scleral icterus.        Right eye: No discharge.         Left eye: No discharge.      Extraocular Movements: Extraocular movements intact.      Conjunctiva/sclera: Conjunctivae normal.   Pulmonary:      Effort: Pulmonary effort is normal. No accessory muscle usage or respiratory distress.   Abdominal:      General: Abdomen is flat.      Palpations: Abdomen is soft.      Tenderness: There is no abdominal tenderness.   Musculoskeletal:      Right lower leg: No edema.      Left lower leg: No edema.   Skin:     General: Skin is warm and dry.      Coloration: Skin is not ashen, cyanotic or jaundiced. "   Neurological:      General: No focal deficit present.      Mental Status: She is alert and oriented to person, place, and time.      Deep Tendon Reflexes: Reflexes normal.   Psychiatric:         Mood and Affect: Mood normal.         Behavior: Behavior is cooperative.           Result Review    Postpartum Depression: Low Risk  (2025)    [place]  Depression Scale     Last EPDS Total Score: 1     Last EPDS Self Harm Result: Never   Recent Concern: Postpartum Depression - Medium Risk (3/30/2025)    [place]  Depression Scale     Last EPDS Total Score: 7     Last EPDS Self Harm Result: Never             Assessment & Plan   Assessment / Plan     Diagnoses and all orders for this visit:    1. Postpartum follow-up (Primary)    2. Preeclampsia in postpartum period    3. Encounter for other general counseling or advice on contraception    4. Lactating mother      Meeting postpartum milestones - restriction still present  Lactation - going well so far  Blood pressure normal. Pulse normal  Contraception - discussed options. Undecided but may consider IUD.     Return in about 4 weeks (around 5/15/2025) for postpartum.      Sanchez San MD

## 2025-04-23 ENCOUNTER — HOSPITAL ENCOUNTER (OUTPATIENT)
Dept: LACTATION | Facility: HOSPITAL | Age: 30
Discharge: HOME | End: 2025-04-23
Payer: COMMERCIAL

## 2025-04-23 NOTE — LACTATION NOTE
Mother's Name:                     Jesus Trevino  G/P:                                        1/1  Significant Medical History:    anxiety, depression, anorexia, bulemia, GERD  Maternal Breast Assessment:  No signs/symptoms mastitis, milk pouring from breasts. No nipple trauma.   Support Person;                      , Cordell  Return to Work                        , inpatient/outpatient hospital [facility] Medical: PRN, max 2 days/wk after maternity leave                                                  Supportive employer      Infant's Name:                       Osmin Trevino  Date of Birth:                           [birth year]  Gestational age at Birth:         36-5  Age:                                         1 month  Physician:                                FLAVIA Buckley DO                 Reason for Visit:                     Weight check and transfer eval                     Infant's Birth weight:                6-10.2             3010g  Katharine+  Previous Weight:                     6-3.3               2815g              Wt Loss:  6.5%  Previous Weight;                     6-3.8                 2828g          Wt Loss:  6.0%  Previous Weight                      6-2.8                2802g              Wt Loss:  6.9%  Previous Weight:  6-4.0                2835g              Wt Loss:  5.8%    Last Weight   6-6.1  2896g  Wt Loss:  3.8%     Today's Weight:  7-5.6                3334g              SURPASSED BIRTH WEIGHT  aT 1  month !           Feeding History Since Discharge/Last Lactation Appt.: Mom bf's for all daytime feeds. She gives EBM per bottle for all night feeds, and pumps. She collects  mls per 10 mins session after every feed. Osmin received 15- 30 mls, 1-2 times per day for several days. Mom would prefer to exc bf with no supplements.      Past 24 Hours Voids/Stools: plenty of each     Color of Stool:   yellow     Time at Breast     Right Breast:      17 mins +34 mls  Left  "Breast: 18 mins +22 mls                    Final Wt:  7-7.6                               Total Minutes:      35      Total Weight Gain:  56 mls or 2 ounces       Average Feeding Amount for Age:  mls, or 3-5 oz at hunger cues are present.     Interventions:  No assistance needed for deep latching in cross cradle hold. Some stimulation needed to keep Osmin drinking at breast.     Feeding Plan:   Bf at cues. There is no need to \"require\" Osmin to bf at certain intervals.   Slowly stop pumping; keeping sessions you desire.  You may bf only at night vs pumping/bottle feeding if you prefer.      Plan of   Further Interventions required with [facility] Lactation     Future Appointments:  Lactation:        as desired by mother:  [phone]  Physician:        Dr. Buckley as ordered  Signature[facility]        [facility] Lockett [facility]  Date:                25  "

## 2025-05-15 ENCOUNTER — POSTPARTUM VISIT (OUTPATIENT)
Dept: OBSTETRICS AND GYNECOLOGY | Age: 30
End: 2025-05-15
Payer: COMMERCIAL

## 2025-05-15 VITALS
DIASTOLIC BLOOD PRESSURE: 74 MMHG | HEIGHT: 69 IN | WEIGHT: 168 LBS | BODY MASS INDEX: 24.88 KG/M2 | SYSTOLIC BLOOD PRESSURE: 100 MMHG

## 2025-05-15 DIAGNOSIS — Z30.09 ENCOUNTER FOR OTHER GENERAL COUNSELING OR ADVICE ON CONTRACEPTION: ICD-10-CM

## 2025-05-15 NOTE — PROGRESS NOTES
Chief Complaint   Patient presents with    Postpartum Care     Patient here for postpartum visit. Vaginal birth on 3/28[birth year] @ 36w5d. Patient reports BP has been good. Denies any problems or concerns. Patient is breast feeding.          History:  Jesus Trevino is a 30 y.o. female who presents today for follow-up for evaluation of the above:  Pt comes in today for 6 week PP visit. Denies any problems. BP readings at home have been good. Had one day where she had mild swelling of ankles but she felt it was related to not being well hydrated and it resolved. Pt is breastfeeding.           ROS:  Review of Systems   Constitutional:  Negative for activity change and unexpected weight loss.   HENT:  Negative for congestion.    Cardiovascular:  Negative for chest pain.   Gastrointestinal:  Negative for blood in stool, constipation and diarrhea.   Endocrine: Negative for cold intolerance and heat intolerance.   Genitourinary:  Negative for dyspareunia, pelvic pain and vaginal discharge.   Musculoskeletal:  Negative for arthralgias, back pain, neck pain and neck stiffness.   Skin:  Negative for rash.   Neurological:  Negative for dizziness and headache.   Psychiatric/Behavioral:  Negative for sleep disturbance. The patient is not nervous/anxious.        Ms. Trevino  reports that she has never smoked. She has never used smokeless tobacco. She reports that she does not currently use alcohol. She reports that she does not use drugs.      Current Outpatient Medications:     sertraline (ZOLOFT) 50 MG tablet, Take 1 tablet by mouth Daily., Disp: , Rfl:     acetaminophen (TYLENOL) 325 MG tablet, Take 2 tablets by mouth Every 6 (Six) Hours As Needed for Mild Pain, Moderate Pain, Headache or Fever (Second Line: Mild pain unrelieved by ibuprofen. Stagger doses 3 hours after dose of ibuprofen.)., Disp: 60 tablet, Rfl: 0    Omega-3 Fatty Acids (OMEGA 3 PO), Take  by mouth., Disp: , Rfl:     omeprazole OTC (PrilOSEC OTC) 20 MG EC  "tablet, 20 mg as needed by oral route., Disp: , Rfl:     prenatal vitamin (prenatal, CLASSIC, vitamin) tablet, Take  by mouth Daily., Disp: , Rfl:       OBJECTIVE:  /74 (BP Location: Left arm, Patient Position: Sitting, Cuff Size: Adult)   Ht 175.3 cm (69\")   Wt 76.2 kg (168 lb)   LMP  (LMP Unknown)   Breastfeeding Yes   BMI 24.81 kg/m²    Physical Exam  Vitals and nursing note reviewed.   Constitutional:       Appearance: She is well-developed.   HENT:      Head: Normocephalic and atraumatic.   Eyes:      General:         Right eye: No discharge.         Left eye: No discharge.      Conjunctiva/sclera: Conjunctivae normal.   Neck:      Thyroid: No thyromegaly.   Cardiovascular:      Rate and Rhythm: Normal rate and regular rhythm.      Heart sounds: Normal heart sounds. No murmur heard.  Pulmonary:      Effort: Pulmonary effort is normal.      Breath sounds: Normal breath sounds.   Musculoskeletal:      Cervical back: Normal range of motion and neck supple.   Skin:     General: Skin is warm and dry.   Neurological:      Mental Status: She is alert and oriented to person, place, and time.   Psychiatric:         Mood and Affect: Mood normal.         Behavior: Behavior normal.         Thought Content: Thought content normal.         Judgment: Judgment normal.         Assessment/Plan    Diagnoses and all orders for this visit:    1. Postpartum follow-up (Primary)    2. Preeclampsia in postpartum period    3. Encounter for other general counseling or advice on contraception    4. Lactating mother    Pt BP normal today and has been normal at home.   Discussed birth control options with breastfeeding. Pt declines anything at this time.         An After Visit Summary was printed and given to the patient at discharge.  Return for Next scheduled follow up. Sooner if problems arise.          MERCEDES Villanueva  Electronically Signed  "